# Patient Record
Sex: FEMALE | Race: WHITE | NOT HISPANIC OR LATINO | URBAN - METROPOLITAN AREA
[De-identification: names, ages, dates, MRNs, and addresses within clinical notes are randomized per-mention and may not be internally consistent; named-entity substitution may affect disease eponyms.]

---

## 2019-01-02 ENCOUNTER — WALK IN (OUTPATIENT)
Dept: URGENT CARE | Age: 34
End: 2019-01-02

## 2019-01-02 VITALS
TEMPERATURE: 98.3 F | DIASTOLIC BLOOD PRESSURE: 80 MMHG | OXYGEN SATURATION: 98 % | SYSTOLIC BLOOD PRESSURE: 108 MMHG | HEART RATE: 76 BPM | RESPIRATION RATE: 16 BRPM

## 2019-01-02 DIAGNOSIS — K04.7 DENTAL INFECTION: ICD-10-CM

## 2019-01-02 DIAGNOSIS — J01.00 ACUTE NON-RECURRENT MAXILLARY SINUSITIS: Primary | ICD-10-CM

## 2019-01-02 DIAGNOSIS — H10.9 BACTERIAL CONJUNCTIVITIS: ICD-10-CM

## 2019-01-02 PROBLEM — K02.9 INFECTED DENTAL CARRIES: Status: ACTIVE | Noted: 2019-01-02

## 2019-01-02 PROCEDURE — 99203 OFFICE O/P NEW LOW 30 MIN: CPT | Performed by: NURSE PRACTITIONER

## 2019-01-02 RX ORDER — LEVALBUTEROL TARTRATE 45 UG/1
1-2 AEROSOL, METERED ORAL EVERY 4 HOURS PRN
COMMUNITY

## 2019-01-02 RX ORDER — CLONAZEPAM 0.5 MG/1
0.5 TABLET ORAL DAILY
COMMUNITY

## 2019-01-02 RX ORDER — AMOXICILLIN AND CLAVULANATE POTASSIUM 875; 125 MG/1; MG/1
1 TABLET, FILM COATED ORAL EVERY 12 HOURS
Qty: 20 TABLET | Refills: 0 | Status: SHIPPED | OUTPATIENT
Start: 2019-01-02 | End: 2019-01-12

## 2019-01-02 RX ORDER — TOBRAMYCIN 3 MG/ML
2 SOLUTION/ DROPS OPHTHALMIC EVERY 4 HOURS
Qty: 1 BOTTLE | Refills: 1 | Status: SHIPPED | OUTPATIENT
Start: 2019-01-02 | End: 2019-01-12

## 2019-01-02 RX ORDER — ACETAMINOPHEN 325 MG/1
650 TABLET ORAL 2 TIMES DAILY
COMMUNITY

## 2019-01-02 ASSESSMENT — ENCOUNTER SYMPTOMS
ACTIVITY CHANGE: 1
CHILLS: 0
EYE DISCHARGE: 1
ENDOCRINE NEGATIVE: 1
FEVER: 1
APPETITE CHANGE: 1
FATIGUE: 1
CHEST TIGHTNESS: 0
EYE ITCHING: 1
SINUS PRESSURE: 1
EYE REDNESS: 1
APNEA: 0
SHORTNESS OF BREATH: 0
GASTROINTESTINAL NEGATIVE: 1
STRIDOR: 0
COUGH: 1
SORE THROAT: 1
CHOKING: 0
NEUROLOGICAL NEGATIVE: 1
WHEEZING: 0
PHOTOPHOBIA: 0

## 2019-01-19 DIAGNOSIS — J01.00 ACUTE NON-RECURRENT MAXILLARY SINUSITIS: ICD-10-CM

## 2019-01-19 RX ORDER — TOBRAMYCIN 3 MG/ML
SOLUTION/ DROPS OPHTHALMIC
Qty: 5 ML | Refills: 0 | OUTPATIENT
Start: 2019-01-19

## 2019-02-17 DIAGNOSIS — J01.00 ACUTE NON-RECURRENT MAXILLARY SINUSITIS: ICD-10-CM

## 2019-02-17 RX ORDER — AMOXICILLIN AND CLAVULANATE POTASSIUM 875; 125 MG/1; MG/1
TABLET, FILM COATED ORAL
Qty: 20 TABLET | Refills: 0 | OUTPATIENT
Start: 2019-02-17

## 2019-03-07 ENCOUNTER — HOSPITAL (OUTPATIENT)
Dept: OTHER | Age: 34
End: 2019-03-07
Attending: EMERGENCY MEDICINE

## 2019-07-26 ENCOUNTER — TELEPHONE (OUTPATIENT)
Dept: SCHEDULING | Age: 34
End: 2019-07-26

## 2019-08-02 ENCOUNTER — HOSPITAL (OUTPATIENT)
Dept: OTHER | Age: 34
End: 2019-08-02

## 2019-08-02 LAB
ANALYZER ANC (IANC): NORMAL
ANION GAP SERPL CALC-SCNC: 9 MMOL/L (ref 10–20)
APTT PPP: 32 SEC (ref 22–32)
APTT PPP: NORMAL S
APTT PPP: NORMAL S
BASOPHILS # BLD: 0.1 K/MCL (ref 0–0.3)
BASOPHILS NFR BLD: 1 %
BUN SERPL-MCNC: 9 MG/DL (ref 6–20)
BUN/CREAT SERPL: 12 (ref 7–25)
CALCIUM SERPL-MCNC: 9.1 MG/DL (ref 8.4–10.2)
CHLORIDE SERPL-SCNC: 108 MMOL/L (ref 98–107)
CO2 SERPL-SCNC: 25 MMOL/L (ref 21–32)
CREAT SERPL-MCNC: 0.74 MG/DL (ref 0.51–0.95)
DIFFERENTIAL METHOD BLD: NORMAL
EOSINOPHIL # BLD: 0.1 K/MCL (ref 0.1–0.5)
EOSINOPHIL NFR BLD: 2 %
ERYTHROCYTE [DISTWIDTH] IN BLOOD: 12.6 % (ref 11–15)
GLUCOSE SERPL-MCNC: 92 MG/DL (ref 65–99)
HCG POINT OF CARE (5HGRST): NEGATIVE
HCT VFR BLD CALC: 41.7 % (ref 36–46.5)
HGB BLD-MCNC: 14.5 G/DL (ref 12–15.5)
IMM GRANULOCYTES # BLD AUTO: 0 K/MCL (ref 0–0.2)
IMM GRANULOCYTES NFR BLD: 0 %
INR PPP: 1
INR PPP: NORMAL
LYMPHOCYTES # BLD: 2 K/MCL (ref 1–4.8)
LYMPHOCYTES NFR BLD: 29 %
MCH RBC QN AUTO: 30.3 PG (ref 26–34)
MCHC RBC AUTO-ENTMCNC: 34.8 G/DL (ref 32–36.5)
MCV RBC AUTO: 87.2 FL (ref 78–100)
MONOCYTES # BLD: 0.6 K/MCL (ref 0.3–0.9)
MONOCYTES NFR BLD: 9 %
NEUTROPHILS # BLD: 4 K/MCL (ref 1.8–7.7)
NEUTROPHILS NFR BLD: 59 %
NEUTS SEG NFR BLD: NORMAL %
NRBC (NRBCRE): 0 /100 WBC
PLATELET # BLD: 254 K/MCL (ref 140–450)
POTASSIUM SERPL-SCNC: 3.9 MMOL/L (ref 3.4–5.1)
PROTHROMBIN TIME (PRT2): NORMAL
PROTHROMBIN TIME: 10.1 SEC (ref 9.7–11.8)
RBC # BLD: 4.78 MIL/MCL (ref 4–5.2)
SODIUM SERPL-SCNC: 138 MMOL/L (ref 135–145)
WBC # BLD: 6.7 K/MCL (ref 4.2–11)

## 2019-09-21 ENCOUNTER — HOSPITAL (OUTPATIENT)
Dept: OTHER | Age: 34
End: 2019-09-21

## 2019-09-21 LAB
AMORPH SED URNS QL MICRO: ABNORMAL
APPEARANCE UR: CLEAR
BACTERIA #/AREA URNS HPF: ABNORMAL /HPF
BILIRUB UR QL: NEGATIVE
CAOX CRY URNS QL MICRO: ABNORMAL
COLOR UR: ABNORMAL
EPITH CASTS #/AREA URNS LPF: ABNORMAL /[LPF]
FATTY CASTS #/AREA URNS LPF: ABNORMAL /[LPF]
GLUCOSE UR-MCNC: NEGATIVE MG/DL
GRAN CASTS #/AREA URNS LPF: ABNORMAL /[LPF]
HCG POINT OF CARE (5HGRST): NEGATIVE
HGB UR QL: NEGATIVE
HYALINE CASTS #/AREA URNS LPF: ABNORMAL /LPF (ref 0–5)
KETONES UR-MCNC: NEGATIVE MG/DL
LEUKOCYTE ESTERASE UR QL STRIP: NEGATIVE
MIXED CELL CASTS #/AREA URNS LPF: ABNORMAL /[LPF]
MUCOUS THREADS URNS QL MICRO: PRESENT
NITRITE UR QL: NEGATIVE
PH UR: 6 UNITS (ref 5–7)
PROT UR QL: NEGATIVE MG/DL
RBC #/AREA URNS HPF: ABNORMAL /HPF (ref 0–2)
RBC CASTS #/AREA URNS LPF: ABNORMAL /[LPF]
RENAL EPI CELLS #/AREA URNS HPF: ABNORMAL /[HPF]
SP GR UR: 1 (ref 1–1.03)
SPECIMEN SOURCE: ABNORMAL
SPERM URNS QL MICRO: ABNORMAL
SQUAMOUS #/AREA URNS HPF: ABNORMAL /HPF (ref 0–5)
T VAGINALIS URNS QL MICRO: ABNORMAL
TRI-PHOS CRY URNS QL MICRO: ABNORMAL
URATE CRY URNS QL MICRO: ABNORMAL
URINE REFLEX: ABNORMAL
URNS CMNT MICRO: ABNORMAL
UROBILINOGEN UR QL: 0.2 MG/DL (ref 0–1)
WAXY CASTS #/AREA URNS LPF: ABNORMAL /[LPF]
WBC #/AREA URNS HPF: ABNORMAL /HPF (ref 0–5)
WBC CASTS #/AREA URNS LPF: ABNORMAL /[LPF]
YEAST HYPHAE URNS QL MICRO: ABNORMAL
YEAST URNS QL MICRO: ABNORMAL

## 2020-02-05 VITALS
RESPIRATION RATE: 14 BRPM | BODY MASS INDEX: 24.73 KG/M2 | OXYGEN SATURATION: 98 % | HEIGHT: 61 IN | DIASTOLIC BLOOD PRESSURE: 83 MMHG | WEIGHT: 131 LBS | SYSTOLIC BLOOD PRESSURE: 132 MMHG | TEMPERATURE: 99 F | HEART RATE: 80 BPM

## 2020-02-05 PROCEDURE — 99284 EMERGENCY DEPT VISIT MOD MDM: CPT

## 2020-02-05 PROCEDURE — 96374 THER/PROPH/DIAG INJ IV PUSH: CPT

## 2020-02-05 PROCEDURE — 93005 ELECTROCARDIOGRAM TRACING: CPT

## 2020-02-05 PROCEDURE — 93010 ELECTROCARDIOGRAM REPORT: CPT | Performed by: EMERGENCY MEDICINE

## 2020-02-06 ENCOUNTER — APPOINTMENT (OUTPATIENT)
Dept: CT IMAGING | Facility: HOSPITAL | Age: 35
End: 2020-02-06
Attending: EMERGENCY MEDICINE
Payer: MEDICAID

## 2020-02-06 ENCOUNTER — HOSPITAL ENCOUNTER (EMERGENCY)
Facility: HOSPITAL | Age: 35
Discharge: HOME | End: 2020-02-06
Attending: EMERGENCY MEDICINE
Payer: MEDICAID

## 2020-02-06 DIAGNOSIS — R10.9 ABDOMINAL PAIN OF UNKNOWN ETIOLOGY: Primary | ICD-10-CM

## 2020-02-06 LAB
ANION GAP SERPL CALC-SCNC: 8 MMOL/L (ref 0–18)
B-HCG UR QL: NEGATIVE
BACTERIA UR QL AUTO: NEGATIVE /HPF
BASOPHILS # BLD AUTO: 0.03 X10(3) UL (ref 0–0.2)
BASOPHILS NFR BLD AUTO: 0.4 %
BILIRUB UR QL: NEGATIVE
BUN BLD-MCNC: 14 MG/DL (ref 7–18)
BUN/CREAT SERPL: 21.9 (ref 10–20)
CALCIUM BLD-MCNC: 8.7 MG/DL (ref 8.5–10.1)
CHLORIDE SERPL-SCNC: 107 MMOL/L (ref 98–112)
CLARITY UR: CLEAR
CO2 SERPL-SCNC: 26 MMOL/L (ref 21–32)
COLOR UR: YELLOW
CREAT BLD-MCNC: 0.64 MG/DL (ref 0.55–1.02)
DEPRECATED RDW RBC AUTO: 39 FL (ref 35.1–46.3)
EOSINOPHIL # BLD AUTO: 0.2 X10(3) UL (ref 0–0.7)
EOSINOPHIL NFR BLD AUTO: 2.4 %
ERYTHROCYTE [DISTWIDTH] IN BLOOD BY AUTOMATED COUNT: 12 % (ref 11–15)
GLUCOSE BLD-MCNC: 109 MG/DL (ref 70–99)
GLUCOSE UR-MCNC: NEGATIVE MG/DL
HCT VFR BLD AUTO: 39.6 % (ref 35–48)
HGB BLD-MCNC: 13.3 G/DL (ref 12–16)
HGB UR QL STRIP.AUTO: NEGATIVE
IMM GRANULOCYTES # BLD AUTO: 0.02 X10(3) UL (ref 0–1)
IMM GRANULOCYTES NFR BLD: 0.2 %
KETONES UR-MCNC: NEGATIVE MG/DL
LYMPHOCYTES # BLD AUTO: 3.21 X10(3) UL (ref 1–4)
LYMPHOCYTES NFR BLD AUTO: 38.5 %
MCH RBC QN AUTO: 29.8 PG (ref 26–34)
MCHC RBC AUTO-ENTMCNC: 33.6 G/DL (ref 31–37)
MCV RBC AUTO: 88.6 FL (ref 80–100)
MONOCYTES # BLD AUTO: 0.56 X10(3) UL (ref 0.1–1)
MONOCYTES NFR BLD AUTO: 6.7 %
NEUTROPHILS # BLD AUTO: 4.31 X10 (3) UL (ref 1.5–7.7)
NEUTROPHILS # BLD AUTO: 4.31 X10(3) UL (ref 1.5–7.7)
NEUTROPHILS NFR BLD AUTO: 51.8 %
NITRITE UR QL STRIP.AUTO: NEGATIVE
OSMOLALITY SERPL CALC.SUM OF ELEC: 293 MOSM/KG (ref 275–295)
PH UR: 5 [PH] (ref 5–8)
PLATELET # BLD AUTO: 281 10(3)UL (ref 150–450)
POTASSIUM SERPL-SCNC: 3.5 MMOL/L (ref 3.5–5.1)
PROT UR-MCNC: NEGATIVE MG/DL
RBC # BLD AUTO: 4.47 X10(6)UL (ref 3.8–5.3)
RBC #/AREA URNS AUTO: 1 /HPF
SODIUM SERPL-SCNC: 141 MMOL/L (ref 136–145)
SP GR UR STRIP: 1.03 (ref 1–1.03)
UROBILINOGEN UR STRIP-ACNC: <2
WBC # BLD AUTO: 8.3 X10(3) UL (ref 4–11)
WBC #/AREA URNS AUTO: 4 /HPF

## 2020-02-06 PROCEDURE — 81025 URINE PREGNANCY TEST: CPT

## 2020-02-06 PROCEDURE — 74177 CT ABD & PELVIS W/CONTRAST: CPT | Performed by: EMERGENCY MEDICINE

## 2020-02-06 PROCEDURE — 80048 BASIC METABOLIC PNL TOTAL CA: CPT | Performed by: EMERGENCY MEDICINE

## 2020-02-06 PROCEDURE — 81001 URINALYSIS AUTO W/SCOPE: CPT | Performed by: EMERGENCY MEDICINE

## 2020-02-06 PROCEDURE — 85025 COMPLETE CBC W/AUTO DIFF WBC: CPT | Performed by: EMERGENCY MEDICINE

## 2020-02-06 RX ORDER — DICYCLOMINE HCL 20 MG
20 TABLET ORAL 4 TIMES DAILY PRN
Qty: 30 TABLET | Refills: 0 | Status: SHIPPED | OUTPATIENT
Start: 2020-02-06 | End: 2020-03-07

## 2020-02-06 RX ORDER — KETOROLAC TROMETHAMINE 15 MG/ML
15 INJECTION, SOLUTION INTRAMUSCULAR; INTRAVENOUS ONCE
Status: COMPLETED | OUTPATIENT
Start: 2020-02-06 | End: 2020-02-06

## 2020-02-06 RX ORDER — FAMOTIDINE 20 MG/1
20 TABLET ORAL 2 TIMES DAILY PRN
Qty: 30 TABLET | Refills: 0 | Status: SHIPPED | OUTPATIENT
Start: 2020-02-06 | End: 2020-03-07

## 2020-02-06 NOTE — ED INITIAL ASSESSMENT (HPI)
Patient with LUQ abd pain radiating into back. Patient reports, \"It's been there for a while. \" Now getting worse. +diarrhea.

## 2020-02-06 NOTE — ED PROVIDER NOTES
Patient Seen in: [facility] AND [facility] Emergency Department      History   Patient presents with:  Abdomen/Flank Pain    Stated Complaint: Chest Pain/ ABD Pain     HPI    26-year-old female presents for complaint of left upper quadrant pain for the past few Appearance: She is well-developed. HENT:      Head: Normocephalic and atraumatic. Eyes:      General: Lids are normal.      Extraocular Movements: Extraocular movements intact. Neck:      Musculoskeletal: Normal range of motion and neck supple.    C Final result                 Please view results for these tests on the individual orders.    RAINBOW DRAW BLUE   RAINBOW DRAW LAVENDER   RAINBOW DRAW LIGHT GREEN   RAINBOW DRAW GOLD   CBC W/ DIFFERENTIAL     EKG    Rate, intervals and axes as noted o

## 2020-02-06 NOTE — ED PROVIDER NOTES
Signed out by previous shift to follow-up diagnostic work-up. Patient had presented with left upper quadrant pain intermittently for several weeks. She was pending a CT scan.     [facility]  [phone] - [address] [address]    NAME:

## 2020-03-30 ENCOUNTER — V-VISIT (OUTPATIENT)
Dept: FAMILY MEDICINE | Age: 35
End: 2020-03-30

## 2020-03-30 DIAGNOSIS — J45.21 MILD INTERMITTENT ASTHMATIC BRONCHITIS WITH ACUTE EXACERBATION: Primary | ICD-10-CM

## 2020-03-30 PROBLEM — J45.901 ASTHMATIC BRONCHITIS WITH ACUTE EXACERBATION: Status: ACTIVE | Noted: 2020-03-30

## 2020-03-30 PROCEDURE — 99213 OFFICE O/P EST LOW 20 MIN: CPT | Performed by: FAMILY MEDICINE

## 2020-03-30 RX ORDER — PREDNISONE 20 MG/1
40 TABLET ORAL DAILY
Qty: 10 TABLET | Refills: 0 | Status: SHIPPED | OUTPATIENT
Start: 2020-03-30 | End: 2020-04-04

## 2020-03-30 ASSESSMENT — ENCOUNTER SYMPTOMS
SHORTNESS OF BREATH: 1
DIARRHEA: 0
COUGH: 1
CHEST TIGHTNESS: 1
ABDOMINAL PAIN: 0
TROUBLE SWALLOWING: 0
FATIGUE: 0
EYE PAIN: 0
CONSTIPATION: 0
SORE THROAT: 0

## 2020-04-05 ENCOUNTER — E-VISIT (OUTPATIENT)
Dept: INTERNAL MEDICINE | Age: 35
End: 2020-04-05

## 2020-04-05 DIAGNOSIS — R05.9 COUGH: Primary | ICD-10-CM

## 2020-04-05 PROCEDURE — 99421 OL DIG E/M SVC 5-10 MIN: CPT | Performed by: INTERNAL MEDICINE

## 2020-04-05 ASSESSMENT — LIFESTYLE VARIABLES: SMOKING_STATUS: SOMETIMES

## 2020-04-09 ENCOUNTER — E-VISIT (OUTPATIENT)
Dept: FAMILY MEDICINE | Age: 35
End: 2020-04-09

## 2020-04-09 DIAGNOSIS — T50.905A ADVERSE EFFECT OF DRUG, INITIAL ENCOUNTER: Primary | ICD-10-CM

## 2020-04-09 PROCEDURE — 99421 OL DIG E/M SVC 5-10 MIN: CPT | Performed by: NURSE PRACTITIONER

## 2020-05-05 ENCOUNTER — TELEPHONE (OUTPATIENT)
Dept: FAMILY MEDICINE | Age: 35
End: 2020-05-05

## 2021-09-25 ENCOUNTER — HOSPITAL ENCOUNTER (EMERGENCY)
Facility: HOSPITAL | Age: 36
Discharge: HOME | End: 2021-09-25
Attending: EMERGENCY MEDICINE
Payer: MEDICAID

## 2021-09-25 ENCOUNTER — APPOINTMENT (OUTPATIENT)
Dept: CT IMAGING | Facility: HOSPITAL | Age: 36
End: 2021-09-25
Attending: EMERGENCY MEDICINE
Payer: MEDICAID

## 2021-09-25 VITALS
BODY MASS INDEX: 26.43 KG/M2 | HEIGHT: 61 IN | DIASTOLIC BLOOD PRESSURE: 78 MMHG | SYSTOLIC BLOOD PRESSURE: 113 MMHG | WEIGHT: 140 LBS | TEMPERATURE: 98 F | HEART RATE: 74 BPM | RESPIRATION RATE: 20 BRPM | OXYGEN SATURATION: 97 %

## 2021-09-25 DIAGNOSIS — F41.9 ANXIETY: ICD-10-CM

## 2021-09-25 DIAGNOSIS — R19.8 GLOBUS SENSATION: Primary | ICD-10-CM

## 2021-09-25 LAB
ANION GAP SERPL CALC-SCNC: 5 MMOL/L (ref 0–18)
B-HCG UR QL: NEGATIVE
BASOPHILS # BLD AUTO: 0.04 X10(3) UL (ref 0–0.2)
BASOPHILS NFR BLD AUTO: 0.5 %
BUN BLD-MCNC: 8 MG/DL (ref 7–18)
BUN/CREAT SERPL: 11.8 (ref 10–20)
CALCIUM BLD-MCNC: 8.6 MG/DL (ref 8.5–10.1)
CHLORIDE SERPL-SCNC: 109 MMOL/L (ref 98–112)
CO2 SERPL-SCNC: 25 MMOL/L (ref 21–32)
CREAT BLD-MCNC: 0.68 MG/DL
DEPRECATED RDW RBC AUTO: 40.3 FL (ref 35.1–46.3)
EOSINOPHIL # BLD AUTO: 0.11 X10(3) UL (ref 0–0.7)
EOSINOPHIL NFR BLD AUTO: 1.2 %
ERYTHROCYTE [DISTWIDTH] IN BLOOD BY AUTOMATED COUNT: 12.7 % (ref 11–15)
GLUCOSE BLD-MCNC: 91 MG/DL (ref 70–99)
HCT VFR BLD AUTO: 42.1 %
HGB BLD-MCNC: 14.1 G/DL
IMM GRANULOCYTES # BLD AUTO: 0.03 X10(3) UL (ref 0–1)
IMM GRANULOCYTES NFR BLD: 0.3 %
LYMPHOCYTES # BLD AUTO: 2.11 X10(3) UL (ref 1–4)
LYMPHOCYTES NFR BLD AUTO: 23.8 %
MCH RBC QN AUTO: 29.2 PG (ref 26–34)
MCHC RBC AUTO-ENTMCNC: 33.5 G/DL (ref 31–37)
MCV RBC AUTO: 87.2 FL
MONOCYTES # BLD AUTO: 0.65 X10(3) UL (ref 0.1–1)
MONOCYTES NFR BLD AUTO: 7.3 %
NEUTROPHILS # BLD AUTO: 5.92 X10 (3) UL (ref 1.5–7.7)
NEUTROPHILS # BLD AUTO: 5.92 X10(3) UL (ref 1.5–7.7)
NEUTROPHILS NFR BLD AUTO: 66.9 %
OSMOLALITY SERPL CALC.SUM OF ELEC: 286 MOSM/KG (ref 275–295)
PLATELET # BLD AUTO: 223 10(3)UL (ref 150–450)
POTASSIUM SERPL-SCNC: 3.6 MMOL/L (ref 3.5–5.1)
RBC # BLD AUTO: 4.83 X10(6)UL
SODIUM SERPL-SCNC: 139 MMOL/L (ref 136–145)
WBC # BLD AUTO: 8.9 X10(3) UL (ref 4–11)

## 2021-09-25 PROCEDURE — 99284 EMERGENCY DEPT VISIT MOD MDM: CPT

## 2021-09-25 PROCEDURE — 85025 COMPLETE CBC W/AUTO DIFF WBC: CPT | Performed by: EMERGENCY MEDICINE

## 2021-09-25 PROCEDURE — 81025 URINE PREGNANCY TEST: CPT

## 2021-09-25 PROCEDURE — 70491 CT SOFT TISSUE NECK W/DYE: CPT | Performed by: EMERGENCY MEDICINE

## 2021-09-25 PROCEDURE — 80048 BASIC METABOLIC PNL TOTAL CA: CPT | Performed by: EMERGENCY MEDICINE

## 2021-09-25 PROCEDURE — 36415 COLL VENOUS BLD VENIPUNCTURE: CPT

## 2021-09-25 RX ORDER — DIPHENHYDRAMINE HYDROCHLORIDE 50 MG/ML
25 INJECTION INTRAMUSCULAR; INTRAVENOUS ONCE
Status: DISCONTINUED | OUTPATIENT
Start: 2021-09-25 | End: 2021-09-25

## 2021-09-25 RX ORDER — METHYLPREDNISOLONE SODIUM SUCCINATE 125 MG/2ML
125 INJECTION, POWDER, LYOPHILIZED, FOR SOLUTION INTRAMUSCULAR; INTRAVENOUS ONCE
Status: DISCONTINUED | OUTPATIENT
Start: 2021-09-25 | End: 2021-09-25

## 2021-09-25 NOTE — ED INITIAL ASSESSMENT (HPI)
Pt c/o neck pain + difficulty swallowing started 14 days ago, sts that she has nodule in her neck and to her parotid gland+thyroid gland but could not get an MRI, denies fever

## 2021-09-25 NOTE — ED QUICK NOTES
Patient safe to DC home per MD. Dixon Schmitz to dress self. DC teaching done, instructions reviewed with patient including when and how to follow up with healthcare providers and when to seek emergency care. The patient verbalizes understanding.  Peripheral IV disc

## 2021-09-25 NOTE — ED PROVIDER NOTES
Patient Seen in: [facility] AND [facility] Emergency Department      History   Patient presents with:  Neck Pain  Difficulty Swallowing    Stated Complaint: Neck pain    Subjective:   HPI    The patient is a 67-year-old female with a history of anxiety who prese SpO2 97%   BMI 26.45 kg/m²         Physical Exam  Vitals and nursing note reviewed. Constitutional:       General: She is not in acute distress. Appearance: Normal appearance. She is well-developed. She is not ill-appearing.    HENT:      Head: Norm Mood and Affect: Mood is anxious. Affect is tearful. Speech: Speech is rapid and pressured. Behavior: Behavior is cooperative. Thought Content:  Thought content normal.         Judgment: Judgment normal.       Differential cheli diagnosis)  Anxiety     Disposition:  Discharge  9/25/2021  6:08 pm    Follow-up:  your doctor    Schedule an appointment as soon as possible for a visit            Medications Prescribed:  There are no discharge medications for this patient.

## 2021-10-08 ENCOUNTER — HOSPITAL ENCOUNTER (EMERGENCY)
Facility: HOSPITAL | Age: 36
Discharge: HOME | End: 2021-10-08
Attending: EMERGENCY MEDICINE
Payer: MEDICAID

## 2021-10-08 VITALS
BODY MASS INDEX: 27.38 KG/M2 | OXYGEN SATURATION: 97 % | HEART RATE: 90 BPM | SYSTOLIC BLOOD PRESSURE: 147 MMHG | WEIGHT: 145 LBS | DIASTOLIC BLOOD PRESSURE: 78 MMHG | TEMPERATURE: 98 F | RESPIRATION RATE: 18 BRPM | HEIGHT: 61 IN

## 2021-10-08 DIAGNOSIS — M26.622 ARTHRALGIA OF LEFT TEMPOROMANDIBULAR JOINT: Primary | ICD-10-CM

## 2021-10-08 PROCEDURE — 99283 EMERGENCY DEPT VISIT LOW MDM: CPT

## 2021-10-08 RX ORDER — IBUPROFEN 400 MG/1
400 TABLET ORAL 2 TIMES DAILY
COMMUNITY

## 2021-10-08 RX ORDER — KETOROLAC TROMETHAMINE 10 MG/1
10 TABLET, FILM COATED ORAL EVERY 6 HOURS PRN
Qty: 20 TABLET | Refills: 0 | Status: SHIPPED | OUTPATIENT
Start: 2021-10-08

## 2021-10-08 RX ORDER — FLUTICASONE PROPIONATE 50 MCG
2 SPRAY, SUSPENSION (ML) NASAL DAILY
COMMUNITY
Start: 2021-06-29

## 2021-10-08 RX ORDER — CLONAZEPAM 0.25 MG/1
0.5 TABLET, ORALLY DISINTEGRATING ORAL AS DIRECTED
COMMUNITY

## 2021-10-08 NOTE — ED INITIAL ASSESSMENT (HPI)
Patient presents for evaluation of increasing pain to left side of face/ear/neck that has been present for over a month. She states she was treated for an ear infection and it did not improve.  She was then evaluated by ENT and was told she needed an Cris

## 2021-10-08 NOTE — ED PROVIDER NOTES
Patient Seen in: [facility] Emergency Department      History   Patient presents with:  Headache    Stated Complaint: head pain radiating down neck    Subjective:   HPI    This is a 30 female who presents with past medical history of anxiety and bibiana Physical Exam    GENERAL: Awake, alert oriented x3, nontoxic appearing. SKIN: Normal, warm, and dry. HEENT:  Pupils equally round and reactive to light. Conjuctiva clear. TMs are clear and intact bilaterally.   Oropharynx is clear and moist.  Mu

## 2022-02-19 ENCOUNTER — HOSPITAL ENCOUNTER (OUTPATIENT)
Age: 37
Discharge: HOME | End: 2022-02-19
Payer: MEDICAID

## 2022-02-19 VITALS
BODY MASS INDEX: 25.4 KG/M2 | OXYGEN SATURATION: 99 % | RESPIRATION RATE: 18 BRPM | SYSTOLIC BLOOD PRESSURE: 131 MMHG | HEIGHT: 62 IN | DIASTOLIC BLOOD PRESSURE: 90 MMHG | WEIGHT: 138 LBS | TEMPERATURE: 98 F | HEART RATE: 103 BPM

## 2022-02-19 DIAGNOSIS — K59.00 CONSTIPATION, UNSPECIFIED CONSTIPATION TYPE: ICD-10-CM

## 2022-02-19 DIAGNOSIS — K64.9 HEMORRHOIDS, UNSPECIFIED HEMORRHOID TYPE: Primary | ICD-10-CM

## 2022-02-19 PROCEDURE — 99213 OFFICE O/P EST LOW 20 MIN: CPT

## 2022-02-19 PROCEDURE — 99212 OFFICE O/P EST SF 10 MIN: CPT

## 2022-02-19 NOTE — ED INITIAL ASSESSMENT (HPI)
Feels constipated, no bm for 6 days, now had 3 bm with blood, + h/o hemorrhoids, lower abd cramps, no n/v

## 2022-03-02 ENCOUNTER — OFFICE VISIT (OUTPATIENT)
Dept: INTEGRATIVE MEDICINE | Facility: CLINIC | Age: 37
End: 2022-03-02
Payer: MEDICAID

## 2022-03-02 VITALS
HEIGHT: 62 IN | OXYGEN SATURATION: 98 % | SYSTOLIC BLOOD PRESSURE: 124 MMHG | DIASTOLIC BLOOD PRESSURE: 80 MMHG | HEART RATE: 101 BPM | BODY MASS INDEX: 26.31 KG/M2 | WEIGHT: 143 LBS

## 2022-03-02 DIAGNOSIS — F32.81 PMDD (PREMENSTRUAL DYSPHORIC DISORDER): ICD-10-CM

## 2022-03-02 DIAGNOSIS — M79.605 LEG PAIN, BILATERAL: Primary | ICD-10-CM

## 2022-03-02 DIAGNOSIS — K02.9 DENTAL CARIES: ICD-10-CM

## 2022-03-02 DIAGNOSIS — M25.50 ARTHRALGIA, UNSPECIFIED JOINT: ICD-10-CM

## 2022-03-02 DIAGNOSIS — K59.00 CONSTIPATION, UNSPECIFIED CONSTIPATION TYPE: ICD-10-CM

## 2022-03-02 DIAGNOSIS — M79.604 LEG PAIN, BILATERAL: Primary | ICD-10-CM

## 2022-03-02 DIAGNOSIS — G43.909 MIGRAINE WITHOUT STATUS MIGRAINOSUS, NOT INTRACTABLE, UNSPECIFIED MIGRAINE TYPE: ICD-10-CM

## 2022-03-02 DIAGNOSIS — L73.2 HYDRADENITIS: ICD-10-CM

## 2022-03-02 PROCEDURE — 3008F BODY MASS INDEX DOCD: CPT | Performed by: FAMILY MEDICINE

## 2022-03-02 PROCEDURE — 3074F SYST BP LT 130 MM HG: CPT | Performed by: FAMILY MEDICINE

## 2022-03-02 PROCEDURE — 99205 OFFICE O/P NEW HI 60 MIN: CPT | Performed by: FAMILY MEDICINE

## 2022-03-02 PROCEDURE — 3079F DIAST BP 80-89 MM HG: CPT | Performed by: FAMILY MEDICINE

## 2022-03-02 RX ORDER — MULTIVITAMIN
TABLET ORAL
COMMUNITY

## 2022-03-02 RX ORDER — EPINEPHRINE 0.3 MG/.3ML
0.3 INJECTION SUBCUTANEOUS
COMMUNITY
Start: 2020-06-16

## 2022-03-03 ENCOUNTER — APPOINTMENT (OUTPATIENT)
Dept: ULTRASOUND IMAGING | Age: 37
End: 2022-03-03
Attending: NURSE PRACTITIONER
Payer: MEDICAID

## 2022-03-03 ENCOUNTER — HOSPITAL ENCOUNTER (OUTPATIENT)
Age: 37
Discharge: HOME | End: 2022-03-03
Payer: MEDICAID

## 2022-03-03 VITALS
HEART RATE: 83 BPM | RESPIRATION RATE: 18 BRPM | TEMPERATURE: 99 F | SYSTOLIC BLOOD PRESSURE: 128 MMHG | OXYGEN SATURATION: 100 % | DIASTOLIC BLOOD PRESSURE: 81 MMHG

## 2022-03-03 DIAGNOSIS — M62.838 SPASM OF MUSCLE: Primary | ICD-10-CM

## 2022-03-03 LAB
#MXD IC: 0.4 X10ˆ3/UL (ref 0.1–1)
B-HCG UR QL: NEGATIVE
BILIRUB UR QL STRIP: NEGATIVE
BUN BLD-MCNC: 9 MG/DL (ref 7–18)
CHLORIDE BLD-SCNC: 105 MMOL/L (ref 98–112)
CLARITY UR: CLEAR
CO2 BLD-SCNC: 24 MMOL/L (ref 21–32)
COLOR UR: YELLOW
CREAT BLD-MCNC: 0.6 MG/DL
GLUCOSE BLD-MCNC: 99 MG/DL (ref 70–99)
GLUCOSE UR STRIP-MCNC: NEGATIVE MG/DL
HCT VFR BLD AUTO: 43.7 %
HCT VFR BLD CALC: 46 %
HGB BLD-MCNC: 14.7 G/DL
HGB UR QL STRIP: NEGATIVE
ISTAT IONIZED CALCIUM FOR CHEM 8: 1.28 MMOL/L (ref 1.12–1.32)
KETONES UR STRIP-MCNC: NEGATIVE MG/DL
LEUKOCYTE ESTERASE UR QL STRIP: NEGATIVE
LYMPHOCYTES # BLD AUTO: 2.3 X10ˆ3/UL (ref 1–4)
LYMPHOCYTES NFR BLD AUTO: 26.7 %
MCH RBC QN AUTO: 29.2 PG (ref 26–34)
MCHC RBC AUTO-ENTMCNC: 33.6 G/DL (ref 31–37)
MCV RBC AUTO: 86.7 FL (ref 80–100)
MIXED CELL %: 4 %
NEUTROPHILS # BLD AUTO: 6.1 X10ˆ3/UL (ref 1.5–7.7)
NEUTROPHILS NFR BLD AUTO: 69.3 %
NITRITE UR QL STRIP: NEGATIVE
PLATELET # BLD AUTO: 212 X10ˆ3/UL (ref 150–450)
POTASSIUM BLD-SCNC: 3.8 MMOL/L (ref 3.6–5.1)
PROT UR STRIP-MCNC: NEGATIVE MG/DL
RBC # BLD AUTO: 5.04 X10ˆ6/UL
SODIUM BLD-SCNC: 140 MMOL/L (ref 136–145)
SP GR UR STRIP: 1.02
UROBILINOGEN UR STRIP-ACNC: <2 MG/DL
WBC # BLD AUTO: 8.8 X10ˆ3/UL (ref 4–11)

## 2022-03-03 PROCEDURE — 80047 BASIC METABLC PNL IONIZED CA: CPT

## 2022-03-03 PROCEDURE — 99214 OFFICE O/P EST MOD 30 MIN: CPT

## 2022-03-03 PROCEDURE — 99213 OFFICE O/P EST LOW 20 MIN: CPT

## 2022-03-03 PROCEDURE — 81025 URINE PREGNANCY TEST: CPT

## 2022-03-03 PROCEDURE — 81002 URINALYSIS NONAUTO W/O SCOPE: CPT

## 2022-03-03 PROCEDURE — 85025 COMPLETE CBC W/AUTO DIFF WBC: CPT | Performed by: NURSE PRACTITIONER

## 2022-03-03 PROCEDURE — 36415 COLL VENOUS BLD VENIPUNCTURE: CPT

## 2022-03-03 PROCEDURE — 93971 EXTREMITY STUDY: CPT | Performed by: NURSE PRACTITIONER

## 2022-03-03 NOTE — ED INITIAL ASSESSMENT (HPI)
Left leg pain for 2 days, no trauma or injury, cms intact, no swelling, denies recent long plane or car rides, no cp, no sob, saw pmd yesterday

## 2022-03-10 ENCOUNTER — LAB ENCOUNTER (OUTPATIENT)
Dept: LAB | Age: 37
End: 2022-03-10
Attending: FAMILY MEDICINE
Payer: MEDICAID

## 2022-03-10 DIAGNOSIS — L73.2 HYDRADENITIS: ICD-10-CM

## 2022-03-10 DIAGNOSIS — K02.9 DENTAL CARIES: ICD-10-CM

## 2022-03-10 DIAGNOSIS — K59.00 CONSTIPATION, UNSPECIFIED CONSTIPATION TYPE: ICD-10-CM

## 2022-03-10 DIAGNOSIS — M79.605 LEG PAIN, BILATERAL: ICD-10-CM

## 2022-03-10 DIAGNOSIS — M79.604 LEG PAIN, BILATERAL: ICD-10-CM

## 2022-03-10 DIAGNOSIS — F32.81 PMDD (PREMENSTRUAL DYSPHORIC DISORDER): ICD-10-CM

## 2022-03-10 DIAGNOSIS — M25.50 ARTHRALGIA, UNSPECIFIED JOINT: ICD-10-CM

## 2022-03-10 DIAGNOSIS — G43.909 MIGRAINE WITHOUT STATUS MIGRAINOSUS, NOT INTRACTABLE, UNSPECIFIED MIGRAINE TYPE: ICD-10-CM

## 2022-03-10 LAB
ALBUMIN SERPL-MCNC: 3.8 G/DL (ref 3.4–5)
ALBUMIN/GLOB SERPL: 1.1 {RATIO} (ref 1–2)
ALP LIVER SERPL-CCNC: 76 U/L
ALT SERPL-CCNC: 18 U/L
ANION GAP SERPL CALC-SCNC: 6 MMOL/L (ref 0–18)
AST SERPL-CCNC: 10 U/L (ref 15–37)
BASOPHILS # BLD AUTO: 0.05 X10(3) UL (ref 0–0.2)
BASOPHILS NFR BLD AUTO: 0.5 %
BILIRUB SERPL-MCNC: 0.3 MG/DL (ref 0.1–2)
BUN BLD-MCNC: 11 MG/DL (ref 7–18)
BUN/CREAT SERPL: 16.2 (ref 10–20)
CALCIUM BLD-MCNC: 9.4 MG/DL (ref 8.5–10.1)
CHLORIDE SERPL-SCNC: 109 MMOL/L (ref 98–112)
CHOLEST SERPL-MCNC: 184 MG/DL (ref ?–200)
CO2 SERPL-SCNC: 27 MMOL/L (ref 21–32)
CREAT BLD-MCNC: 0.68 MG/DL
CRP SERPL-MCNC: <0.29 MG/DL (ref ?–0.3)
DEPRECATED HBV CORE AB SER IA-ACNC: 86.5 NG/ML
DEPRECATED RDW RBC AUTO: 42.5 FL (ref 35.1–46.3)
DHEA-S SERPL-MCNC: 255 UG/DL
EOSINOPHIL # BLD AUTO: 0.15 X10(3) UL (ref 0–0.7)
EOSINOPHIL NFR BLD AUTO: 1.4 %
ERYTHROCYTE [DISTWIDTH] IN BLOOD BY AUTOMATED COUNT: 13.1 % (ref 11–15)
FASTING PATIENT LIPID ANSWER: YES
FOLATE SERPL-MCNC: 15.2 NG/ML (ref 8.7–?)
FSH SERPL-ACNC: 4.4 MIU/ML
GGT SERPL-CCNC: 14 U/L
GLOBULIN PLAS-MCNC: 3.4 G/DL (ref 2.8–4.4)
GLUCOSE BLD-MCNC: 97 MG/DL (ref 70–99)
HCT VFR BLD AUTO: 44.9 %
HDLC SERPL-MCNC: 45 MG/DL (ref 40–59)
HGB BLD-MCNC: 14.6 G/DL
IMM GRANULOCYTES # BLD AUTO: 0.04 X10(3) UL (ref 0–1)
IMM GRANULOCYTES NFR BLD: 0.4 %
IRON SATN MFR SERPL: 13 %
IRON SERPL-MCNC: 41 UG/DL
LDLC SERPL CALC-MCNC: 125 MG/DL (ref ?–100)
LH SERPL-ACNC: 9.3 MIU/ML
LYMPHOCYTES # BLD AUTO: 2.22 X10(3) UL (ref 1–4)
LYMPHOCYTES NFR BLD AUTO: 21.4 %
MAGNESIUM SERPL-MCNC: 2 MG/DL (ref 1.6–2.6)
MCH RBC QN AUTO: 29 PG (ref 26–34)
MCHC RBC AUTO-ENTMCNC: 32.5 G/DL (ref 31–37)
MCV RBC AUTO: 89.1 FL
MONOCYTES # BLD AUTO: 0.58 X10(3) UL (ref 0.1–1)
MONOCYTES NFR BLD AUTO: 5.6 %
NEUTROPHILS # BLD AUTO: 7.34 X10 (3) UL (ref 1.5–7.7)
NEUTROPHILS # BLD AUTO: 7.34 X10(3) UL (ref 1.5–7.7)
NEUTROPHILS NFR BLD AUTO: 70.7 %
NONHDLC SERPL-MCNC: 139 MG/DL (ref ?–130)
OSMOLALITY SERPL CALC.SUM OF ELEC: 293 MOSM/KG (ref 275–295)
PLATELET # BLD AUTO: 232 10(3)UL (ref 150–450)
POTASSIUM SERPL-SCNC: 4.1 MMOL/L (ref 3.5–5.1)
PROGEST SERPL-MCNC: 1.34 NG/ML
PROT SERPL-MCNC: 7.2 G/DL (ref 6.4–8.2)
RBC # BLD AUTO: 5.04 X10(6)UL
SODIUM SERPL-SCNC: 142 MMOL/L (ref 136–145)
T3FREE SERPL-MCNC: 3.04 PG/ML (ref 2.4–4.2)
T4 FREE SERPL-MCNC: 1.2 NG/DL (ref 0.8–1.7)
TIBC SERPL-MCNC: 305 UG/DL (ref 240–450)
TRANSFERRIN SERPL-MCNC: 205 MG/DL (ref 200–360)
TRIGL SERPL-MCNC: 77 MG/DL (ref 30–149)
TSI SER-ACNC: 0.68 MIU/ML (ref 0.36–3.74)
VIT B12 SERPL-MCNC: 324 PG/ML (ref 193–986)
VIT D+METAB SERPL-MCNC: 28 NG/ML (ref 30–100)
VLDLC SERPL CALC-MCNC: 14 MG/DL (ref 0–30)

## 2022-03-10 PROCEDURE — 82607 VITAMIN B-12: CPT

## 2022-03-10 PROCEDURE — 80053 COMPREHEN METABOLIC PANEL: CPT

## 2022-03-10 PROCEDURE — 82977 ASSAY OF GGT: CPT

## 2022-03-10 PROCEDURE — 83540 ASSAY OF IRON: CPT

## 2022-03-10 PROCEDURE — 83001 ASSAY OF GONADOTROPIN (FSH): CPT

## 2022-03-10 PROCEDURE — 84466 ASSAY OF TRANSFERRIN: CPT

## 2022-03-10 PROCEDURE — 80061 LIPID PANEL: CPT

## 2022-03-10 PROCEDURE — 86038 ANTINUCLEAR ANTIBODIES: CPT

## 2022-03-10 PROCEDURE — 84439 ASSAY OF FREE THYROXINE: CPT

## 2022-03-10 PROCEDURE — 82627 DEHYDROEPIANDROSTERONE: CPT

## 2022-03-10 PROCEDURE — 82671 ASSAY OF ESTROGENS: CPT

## 2022-03-10 PROCEDURE — 82746 ASSAY OF FOLIC ACID SERUM: CPT

## 2022-03-10 PROCEDURE — 82306 VITAMIN D 25 HYDROXY: CPT

## 2022-03-10 PROCEDURE — 83735 ASSAY OF MAGNESIUM: CPT

## 2022-03-10 PROCEDURE — 84481 FREE ASSAY (FT-3): CPT

## 2022-03-10 PROCEDURE — 84403 ASSAY OF TOTAL TESTOSTERONE: CPT

## 2022-03-10 PROCEDURE — 83002 ASSAY OF GONADOTROPIN (LH): CPT

## 2022-03-10 PROCEDURE — 84402 ASSAY OF FREE TESTOSTERONE: CPT

## 2022-03-10 PROCEDURE — 82728 ASSAY OF FERRITIN: CPT

## 2022-03-10 PROCEDURE — 84443 ASSAY THYROID STIM HORMONE: CPT

## 2022-03-10 PROCEDURE — 85025 COMPLETE CBC W/AUTO DIFF WBC: CPT

## 2022-03-10 PROCEDURE — 36415 COLL VENOUS BLD VENIPUNCTURE: CPT

## 2022-03-10 PROCEDURE — 86140 C-REACTIVE PROTEIN: CPT

## 2022-03-10 PROCEDURE — 84144 ASSAY OF PROGESTERONE: CPT

## 2022-03-14 LAB — NUCLEAR IGG TITR SER IF: NEGATIVE {TITER}

## 2022-03-18 LAB
TESTOSTERONE, FREE, S: 0.67 NG/DL
TESTOSTERONE, TOTAL, S: 28 NG/DL

## 2022-03-19 LAB
ESTRADIOL BY TMS: 130.2 PG/ML
ESTROGENS TOTAL CALCULATION: 179 PG/ML
ESTRONE BY TMS: 48.8 PG/ML

## 2022-04-21 ENCOUNTER — MED REC SCAN ONLY (OUTPATIENT)
Dept: INTEGRATIVE MEDICINE | Facility: CLINIC | Age: 37
End: 2022-04-21

## 2022-04-22 ENCOUNTER — TELEMEDICINE (OUTPATIENT)
Dept: INTEGRATIVE MEDICINE | Facility: CLINIC | Age: 37
End: 2022-04-22

## 2022-04-22 DIAGNOSIS — M79.671 RIGHT FOOT PAIN: ICD-10-CM

## 2022-04-22 DIAGNOSIS — G43.909 MIGRAINE WITHOUT STATUS MIGRAINOSUS, NOT INTRACTABLE, UNSPECIFIED MIGRAINE TYPE: Primary | ICD-10-CM

## 2022-04-22 DIAGNOSIS — F32.81 PMDD (PREMENSTRUAL DYSPHORIC DISORDER): ICD-10-CM

## 2022-05-11 ENCOUNTER — TELEPHONE (OUTPATIENT)
Dept: INTEGRATIVE MEDICINE | Facility: CLINIC | Age: 37
End: 2022-05-11

## 2022-05-11 NOTE — TELEPHONE ENCOUNTER
Received Emergency room  Report form   [facility]. Placed on providers desk for review.

## 2022-05-17 ENCOUNTER — TELEPHONE (OUTPATIENT)
Dept: INTEGRATIVE MEDICINE | Facility: CLINIC | Age: 37
End: 2022-05-17

## 2022-05-17 NOTE — TELEPHONE ENCOUNTER
Microbiology report received  From [facility]  and placed on providers desk for review.

## 2022-07-19 PROBLEM — J45.909 ASTHMA: Status: ACTIVE | Noted: 2020-03-30

## 2022-07-19 PROBLEM — G44.59 OTHER COMPLICATED HEADACHE SYNDROME: Status: ACTIVE | Noted: 2019-10-29

## 2022-07-19 PROBLEM — F41.9 ANXIETY: Status: ACTIVE | Noted: 2022-07-19

## 2022-07-19 PROBLEM — J45.909 ASTHMA (HCC): Status: ACTIVE | Noted: 2020-03-30

## 2022-07-19 PROBLEM — K02.9 INFECTED DENTAL CARIES: Status: ACTIVE | Noted: 2019-01-02

## 2022-07-19 PROBLEM — K04.7 INFECTED DENTAL CARIES: Status: ACTIVE | Noted: 2019-01-02

## 2022-07-20 ENCOUNTER — TELEMEDICINE (OUTPATIENT)
Dept: FAMILY MEDICINE CLINIC | Facility: CLINIC | Age: 37
End: 2022-07-20

## 2022-07-20 DIAGNOSIS — N92.6 IRREGULAR MENSES: Primary | ICD-10-CM

## 2022-07-20 DIAGNOSIS — E61.1 IRON DEFICIENCY: ICD-10-CM

## 2022-07-20 DIAGNOSIS — R63.5 WEIGHT GAIN: ICD-10-CM

## 2022-07-20 DIAGNOSIS — L65.9 HAIR LOSS: ICD-10-CM

## 2022-07-20 PROCEDURE — 99214 OFFICE O/P EST MOD 30 MIN: CPT | Performed by: NURSE PRACTITIONER

## 2022-07-25 ENCOUNTER — HOSPITAL ENCOUNTER (OUTPATIENT)
Age: 37
Discharge: HOME | End: 2022-07-25
Payer: MEDICAID

## 2022-07-25 VITALS
RESPIRATION RATE: 22 BRPM | TEMPERATURE: 99 F | DIASTOLIC BLOOD PRESSURE: 78 MMHG | HEART RATE: 102 BPM | OXYGEN SATURATION: 100 % | SYSTOLIC BLOOD PRESSURE: 135 MMHG

## 2022-07-25 DIAGNOSIS — L73.2 HIDRADENITIS SUPPURATIVA OF RIGHT AXILLA: ICD-10-CM

## 2022-07-25 DIAGNOSIS — L73.2 HIDRADENITIS SUPPURATIVA OF LEFT AXILLA: ICD-10-CM

## 2022-07-25 DIAGNOSIS — L02.412 ABSCESS OF AXILLA, LEFT: Primary | ICD-10-CM

## 2022-07-25 DIAGNOSIS — L02.91 ABSCESS: ICD-10-CM

## 2022-07-25 PROCEDURE — 99213 OFFICE O/P EST LOW 20 MIN: CPT

## 2022-07-25 PROCEDURE — 10061 I&D ABSCESS COMP/MULTIPLE: CPT

## 2022-07-25 RX ORDER — CLINDAMYCIN PHOSPHATE 10 MG/G
1 GEL TOPICAL 2 TIMES DAILY
Qty: 30 G | Refills: 0 | Status: SHIPPED | OUTPATIENT
Start: 2022-07-25

## 2022-07-25 NOTE — ED INITIAL ASSESSMENT (HPI)
Abscess to left underarm for 1 week. Hx of hydrogenitis superativa. No fever. States she feels generally \"unwell\" and anxious. EMS/Patient

## 2022-08-23 ENCOUNTER — TELEMEDICINE (OUTPATIENT)
Dept: TELEHEALTH | Age: 37
End: 2022-08-23

## 2022-08-23 DIAGNOSIS — U07.1 POSITIVE SELF-ADMINISTERED ANTIGEN TEST FOR COVID-19: Primary | ICD-10-CM

## 2022-08-23 PROCEDURE — 99213 OFFICE O/P EST LOW 20 MIN: CPT | Performed by: NURSE PRACTITIONER

## 2022-08-23 RX ORDER — BENZONATATE 200 MG/1
200 CAPSULE ORAL 3 TIMES DAILY PRN
Qty: 20 CAPSULE | Refills: 0 | Status: SHIPPED | OUTPATIENT
Start: 2022-08-23

## 2022-08-23 RX ORDER — FLUTICASONE PROPIONATE 50 MCG
2 SPRAY, SUSPENSION (ML) NASAL DAILY
Qty: 1 EACH | Refills: 0 | Status: SHIPPED | OUTPATIENT
Start: 2022-08-23 | End: 2022-09-06

## 2022-08-29 ENCOUNTER — MED REC SCAN ONLY (OUTPATIENT)
Dept: INTEGRATIVE MEDICINE | Facility: CLINIC | Age: 37
End: 2022-08-29

## 2022-09-30 ENCOUNTER — TELEMEDICINE (OUTPATIENT)
Dept: INTEGRATIVE MEDICINE | Facility: CLINIC | Age: 37
End: 2022-09-30

## 2022-09-30 DIAGNOSIS — R63.5 WEIGHT GAIN: ICD-10-CM

## 2022-09-30 DIAGNOSIS — F32.81 PMDD (PREMENSTRUAL DYSPHORIC DISORDER): ICD-10-CM

## 2022-09-30 DIAGNOSIS — K21.9 GASTROESOPHAGEAL REFLUX DISEASE, UNSPECIFIED WHETHER ESOPHAGITIS PRESENT: ICD-10-CM

## 2022-09-30 DIAGNOSIS — E04.1 THYROID NODULE: ICD-10-CM

## 2022-09-30 DIAGNOSIS — G43.909 MIGRAINE WITHOUT STATUS MIGRAINOSUS, NOT INTRACTABLE, UNSPECIFIED MIGRAINE TYPE: Primary | ICD-10-CM

## 2022-09-30 PROCEDURE — 99214 OFFICE O/P EST MOD 30 MIN: CPT | Performed by: FAMILY MEDICINE

## 2022-10-03 ENCOUNTER — HOSPITAL ENCOUNTER (OUTPATIENT)
Age: 37
Discharge: HOME | End: 2022-10-03
Payer: MEDICAID

## 2022-10-03 VITALS
SYSTOLIC BLOOD PRESSURE: 137 MMHG | DIASTOLIC BLOOD PRESSURE: 87 MMHG | OXYGEN SATURATION: 99 % | TEMPERATURE: 99 F | HEART RATE: 108 BPM | RESPIRATION RATE: 20 BRPM

## 2022-10-03 DIAGNOSIS — K21.00 GASTROESOPHAGEAL REFLUX DISEASE WITH ESOPHAGITIS WITHOUT HEMORRHAGE: Primary | ICD-10-CM

## 2022-10-03 LAB — B-HCG UR QL: NEGATIVE

## 2022-10-03 PROCEDURE — 99213 OFFICE O/P EST LOW 20 MIN: CPT

## 2022-10-03 PROCEDURE — 81025 URINE PREGNANCY TEST: CPT

## 2022-10-03 RX ORDER — DICYCLOMINE HYDROCHLORIDE 10 MG/5ML
20 SOLUTION ORAL ONCE
Status: COMPLETED | OUTPATIENT
Start: 2022-10-03 | End: 2022-10-03

## 2022-10-03 RX ORDER — LIDOCAINE HYDROCHLORIDE 20 MG/ML
10 SOLUTION OROPHARYNGEAL ONCE
Status: COMPLETED | OUTPATIENT
Start: 2022-10-03 | End: 2022-10-03

## 2022-10-03 RX ORDER — MAGNESIUM HYDROXIDE/ALUMINUM HYDROXICE/SIMETHICONE 120; 1200; 1200 MG/30ML; MG/30ML; MG/30ML
30 SUSPENSION ORAL ONCE
Status: COMPLETED | OUTPATIENT
Start: 2022-10-03 | End: 2022-10-03

## 2022-10-04 NOTE — ED INITIAL ASSESSMENT (HPI)
Patient reports a horrible dull burning pain in her stomach since Thursday.  + decreased appetite, nausea. Denies emesis. Denies blood in stool. Reports hx of gi issues.

## 2022-10-06 ENCOUNTER — MED REC SCAN ONLY (OUTPATIENT)
Dept: INTEGRATIVE MEDICINE | Facility: CLINIC | Age: 37
End: 2022-10-06

## 2022-10-31 ENCOUNTER — TELEMEDICINE (OUTPATIENT)
Dept: SURGERY | Facility: CLINIC | Age: 37
End: 2022-10-31

## 2022-10-31 VITALS — BODY MASS INDEX: 28 KG/M2 | WEIGHT: 153 LBS

## 2022-10-31 DIAGNOSIS — R63.5 WEIGHT GAIN: ICD-10-CM

## 2022-10-31 DIAGNOSIS — E78.5 DYSLIPIDEMIA: Primary | ICD-10-CM

## 2022-10-31 DIAGNOSIS — Z72.0 TOBACCO ABUSE: ICD-10-CM

## 2022-10-31 DIAGNOSIS — E66.9 OBESITY (BMI 30-39.9): ICD-10-CM

## 2022-10-31 DIAGNOSIS — F41.9 ANXIETY: ICD-10-CM

## 2022-10-31 DIAGNOSIS — D64.9 ANEMIA, UNSPECIFIED TYPE: ICD-10-CM

## 2022-10-31 RX ORDER — CLONAZEPAM 0.5 MG/1
0.5 TABLET ORAL DAILY
COMMUNITY
End: 2022-10-31

## 2022-10-31 NOTE — PATIENT INSTRUCTIONS
Bike  3 days/week- 20 minute sessions    Phentermine  Phentermine + topiramate   Wellbutrin  Metformin    Daily Calories:  120-174 lbs: 1200 calories/day. 175-219 lbs: 1500 calories/day. 220-249 lbs: 1800 calories/day. 250 lbs or more: 2,000 calories/day. 55 grams protein/day.  grams carbs/day. Consider PGX supplement -1-2 capsules 20 minutes prior to dinner with a big glass of water. I recommend a whole food, plant powered diet with low glycemic index:     Aim for 3 meals a day and 1-2 snacks as needed. Aim for a protein + produce at each meal time. Keep meals between 7 am and 7 pm.     Breakfast ideas:  1. Fruit and nuts/seeds. 2. Eggs scrambled with vegetables, cottage cheese. 3. Oatmeal (stovetop), cinnamon, 2 tbsp flaxseed, berries. 4. Protein shake + fruit. (1 scoop with water/ice). Annalisa Singh is a good brand. Snacks:  Raw vegetables and hummus, apples and peanut butter, nuts, seeds, fruit. Use the Healthy Plate method for lunch and dinner:  1/2 right side of plate non-starchy vegetables. Bottom left 1/4 plate protein. Top left 1/4 starch as desired. Aim for a total of:  2 fruits a day (avocado, tomato, citrus/oranges, apples, berries)    4 non-starchy vegetables (handful) (greens, peppers, onions, garlic, broccoli, cauliflower, etc.)    0-2 starches (oatmeal, sweet potato, carrots, brown rice, etc). 3 protein per day (fish, seafood, meat, or plants: salmon, nuts, seeds, shrimp, chicken, turkey, beans, lentils, chickpeas, etc).     2 healthy fats (avocado, avocado oil, olives, olive oil, salmon, nuts, seeds)

## 2022-11-22 ENCOUNTER — TELEMEDICINE (OUTPATIENT)
Dept: INTEGRATIVE MEDICINE | Facility: CLINIC | Age: 37
End: 2022-11-22

## 2022-11-22 ENCOUNTER — TELEPHONE (OUTPATIENT)
Dept: INTEGRATIVE MEDICINE | Facility: CLINIC | Age: 37
End: 2022-11-22

## 2022-11-22 ENCOUNTER — MED REC SCAN ONLY (OUTPATIENT)
Dept: INTEGRATIVE MEDICINE | Facility: CLINIC | Age: 37
End: 2022-11-22

## 2022-11-22 DIAGNOSIS — R63.5 WEIGHT GAIN: ICD-10-CM

## 2022-11-22 DIAGNOSIS — R19.04 LEFT LOWER QUADRANT ABDOMINAL MASS: Primary | ICD-10-CM

## 2022-11-22 DIAGNOSIS — R22.2 MASS OF CHEST WALL, LEFT: ICD-10-CM

## 2022-11-22 PROCEDURE — 99214 OFFICE O/P EST MOD 30 MIN: CPT | Performed by: FAMILY MEDICINE

## 2022-12-08 ENCOUNTER — HOSPITAL ENCOUNTER (OUTPATIENT)
Age: 37
Discharge: HOME | End: 2022-12-08
Payer: MEDICAID

## 2022-12-08 VITALS
SYSTOLIC BLOOD PRESSURE: 130 MMHG | RESPIRATION RATE: 18 BRPM | DIASTOLIC BLOOD PRESSURE: 83 MMHG | HEART RATE: 95 BPM | OXYGEN SATURATION: 99 % | TEMPERATURE: 98 F

## 2022-12-08 DIAGNOSIS — R22.9 LUMPS ON THE SKIN: Primary | ICD-10-CM

## 2022-12-08 PROCEDURE — 99213 OFFICE O/P EST LOW 20 MIN: CPT

## 2022-12-08 PROCEDURE — 99212 OFFICE O/P EST SF 10 MIN: CPT

## 2022-12-08 NOTE — ED INITIAL ASSESSMENT (HPI)
Per pt has been having \"lumps\" around sternum area and right rib area. Reports can palpate lumps for about 5-6 months. Increase in pain reported.

## 2022-12-08 NOTE — DISCHARGE INSTRUCTIONS
Go to scheduled US of chest and abdomen Schedule mamogram  Follow up  with your doctor  Return for concerns

## 2022-12-09 ENCOUNTER — LAB ENCOUNTER (OUTPATIENT)
Dept: LAB | Age: 37
End: 2022-12-09
Attending: NURSE PRACTITIONER
Payer: MEDICAID

## 2022-12-09 DIAGNOSIS — D64.9 ANEMIA, UNSPECIFIED TYPE: ICD-10-CM

## 2022-12-09 DIAGNOSIS — Z72.0 TOBACCO ABUSE: ICD-10-CM

## 2022-12-09 DIAGNOSIS — R63.5 WEIGHT GAIN: ICD-10-CM

## 2022-12-09 DIAGNOSIS — E04.1 THYROID NODULE: ICD-10-CM

## 2022-12-09 DIAGNOSIS — L65.9 HAIR LOSS: ICD-10-CM

## 2022-12-09 DIAGNOSIS — E61.1 IRON DEFICIENCY: ICD-10-CM

## 2022-12-09 DIAGNOSIS — E78.5 DYSLIPIDEMIA: ICD-10-CM

## 2022-12-09 DIAGNOSIS — E66.9 OBESITY (BMI 30-39.9): ICD-10-CM

## 2022-12-09 DIAGNOSIS — F41.9 ANXIETY: ICD-10-CM

## 2022-12-09 LAB
DEPRECATED HBV CORE AB SER IA-ACNC: 127.9 NG/ML
INSULIN SERPL-ACNC: 12.3 MU/L (ref 3–25)
IRON SATN MFR SERPL: 25 %
IRON SERPL-MCNC: 73 UG/DL
T4 FREE SERPL-MCNC: 1.3 NG/DL (ref 0.8–1.7)
THYROGLOB SERPL-MCNC: <15 U/ML (ref ?–60)
THYROPEROXIDASE AB SERPL-ACNC: 48 U/ML (ref ?–60)
TIBC SERPL-MCNC: 295 UG/DL (ref 240–450)
TRANSFERRIN SERPL-MCNC: 198 MG/DL (ref 200–360)
TSI SER-ACNC: 1.85 MIU/ML (ref 0.36–3.74)

## 2022-12-09 PROCEDURE — 86800 THYROGLOBULIN ANTIBODY: CPT

## 2022-12-09 PROCEDURE — 83525 ASSAY OF INSULIN: CPT

## 2022-12-09 PROCEDURE — 86376 MICROSOMAL ANTIBODY EACH: CPT

## 2022-12-09 PROCEDURE — 84439 ASSAY OF FREE THYROXINE: CPT

## 2022-12-09 PROCEDURE — 36415 COLL VENOUS BLD VENIPUNCTURE: CPT

## 2022-12-09 PROCEDURE — 82397 CHEMILUMINESCENT ASSAY: CPT

## 2022-12-09 PROCEDURE — 82728 ASSAY OF FERRITIN: CPT

## 2022-12-09 PROCEDURE — 83540 ASSAY OF IRON: CPT

## 2022-12-09 PROCEDURE — 84443 ASSAY THYROID STIM HORMONE: CPT

## 2022-12-09 PROCEDURE — 84466 ASSAY OF TRANSFERRIN: CPT

## 2022-12-12 LAB — LEPTIN: 10.8 NG/ML

## 2022-12-13 ENCOUNTER — TELEPHONE (OUTPATIENT)
Dept: INTEGRATIVE MEDICINE | Facility: CLINIC | Age: 37
End: 2022-12-13

## 2022-12-13 NOTE — TELEPHONE ENCOUNTER
Patient called office to inquire about scheduling chest ultrasound. Patient states that she has not been able to schedule the ultrasound of her chest at [facility]. Patient would like to schedule ultrasound at [facility]. RN gave patient number for central scheduling to schedule her ultrasound. Patient verbalized understanding and agreement with the plan.

## 2023-01-11 ENCOUNTER — MED REC SCAN ONLY (OUTPATIENT)
Dept: INTEGRATIVE MEDICINE | Facility: CLINIC | Age: 38
End: 2023-01-11

## 2023-01-15 ENCOUNTER — HOSPITAL ENCOUNTER (OUTPATIENT)
Dept: ULTRASOUND IMAGING | Age: 38
Discharge: HOME | End: 2023-01-15
Attending: FAMILY MEDICINE
Payer: MEDICAID

## 2023-01-15 ENCOUNTER — HOSPITAL ENCOUNTER (OUTPATIENT)
Dept: ULTRASOUND IMAGING | Age: 38
End: 2023-01-15
Attending: FAMILY MEDICINE
Payer: MEDICAID

## 2023-01-15 DIAGNOSIS — R19.04 LEFT LOWER QUADRANT ABDOMINAL MASS: ICD-10-CM

## 2023-01-15 DIAGNOSIS — R22.2 MASS OF CHEST WALL, LEFT: ICD-10-CM

## 2023-01-15 PROCEDURE — 76705 ECHO EXAM OF ABDOMEN: CPT | Performed by: FAMILY MEDICINE

## 2023-01-15 PROCEDURE — 76604 US EXAM CHEST: CPT | Performed by: FAMILY MEDICINE

## 2023-01-16 ENCOUNTER — HOSPITAL ENCOUNTER (OUTPATIENT)
Age: 38
Discharge: HOME | End: 2023-01-16
Attending: EMERGENCY MEDICINE
Payer: MEDICAID

## 2023-01-16 VITALS
DIASTOLIC BLOOD PRESSURE: 78 MMHG | OXYGEN SATURATION: 100 % | TEMPERATURE: 98 F | RESPIRATION RATE: 18 BRPM | SYSTOLIC BLOOD PRESSURE: 122 MMHG | HEART RATE: 105 BPM

## 2023-01-16 DIAGNOSIS — L02.419 AXILLARY ABSCESS: Primary | ICD-10-CM

## 2023-01-16 PROCEDURE — 99213 OFFICE O/P EST LOW 20 MIN: CPT

## 2023-01-16 RX ORDER — CLINDAMYCIN HYDROCHLORIDE 150 MG/1
450 CAPSULE ORAL 3 TIMES DAILY
Qty: 90 CAPSULE | Refills: 0 | Status: SHIPPED | OUTPATIENT
Start: 2023-01-16 | End: 2023-01-26

## 2023-01-17 NOTE — DISCHARGE INSTRUCTIONS
Thank you for visiting our immediate care for your health care needs. Please follow up with dermatology as referred. If you have any additional problems please return to the immediate care. Please take clindamycin as prescribed. Return to immediate care/emergency department if you change your mind about incision and drainage.

## 2023-01-30 ENCOUNTER — OFFICE VISIT (OUTPATIENT)
Dept: DERMATOLOGY CLINIC | Facility: CLINIC | Age: 38
End: 2023-01-30

## 2023-01-30 DIAGNOSIS — L73.2 HIDRADENITIS SUPPURATIVA OF LEFT AXILLA: ICD-10-CM

## 2023-01-30 DIAGNOSIS — L73.2 HIDRADENITIS SUPPURATIVA OF RIGHT AXILLA: ICD-10-CM

## 2023-01-30 PROCEDURE — 99204 OFFICE O/P NEW MOD 45 MIN: CPT | Performed by: STUDENT IN AN ORGANIZED HEALTH CARE EDUCATION/TRAINING PROGRAM

## 2023-01-30 RX ORDER — CLINDAMYCIN PHOSPHATE 10 MG/G
1 GEL TOPICAL 2 TIMES DAILY
Qty: 30 G | Refills: 0 | Status: SHIPPED | OUTPATIENT
Start: 2023-01-30

## 2023-01-30 NOTE — PATIENT INSTRUCTIONS
-Use Hibaclens daily. Once done with hibaclens switch to Acne foaming cleanser. Be careful as it could bleach towels.    -OK to use LMX or EMLA for topical numbing if injection needed in future

## 2023-02-09 ENCOUNTER — PATIENT MESSAGE (OUTPATIENT)
Dept: DERMATOLOGY CLINIC | Facility: CLINIC | Age: 38
End: 2023-02-09

## 2023-02-09 NOTE — TELEPHONE ENCOUNTER
LOV 1/30/23 - Dr. Cee Alford - please see pt's message and photos below. Please advise if pt should come back in for a follow up. Thank you.

## 2023-02-13 ENCOUNTER — HOSPITAL ENCOUNTER (OUTPATIENT)
Age: 38
Discharge: HOME | End: 2023-02-13
Payer: MEDICAID

## 2023-02-13 ENCOUNTER — APPOINTMENT (OUTPATIENT)
Dept: ULTRASOUND IMAGING | Age: 38
End: 2023-02-13
Attending: NURSE PRACTITIONER
Payer: MEDICAID

## 2023-02-13 VITALS
RESPIRATION RATE: 20 BRPM | TEMPERATURE: 99 F | SYSTOLIC BLOOD PRESSURE: 127 MMHG | OXYGEN SATURATION: 98 % | DIASTOLIC BLOOD PRESSURE: 84 MMHG | HEART RATE: 100 BPM

## 2023-02-13 DIAGNOSIS — N94.9 VAGINAL DISCOMFORT: ICD-10-CM

## 2023-02-13 DIAGNOSIS — N88.8 NABOTHIAN CYST: Primary | ICD-10-CM

## 2023-02-13 LAB
B-HCG UR QL: NEGATIVE
BILIRUB UR QL STRIP: NEGATIVE
CLARITY UR: CLEAR
COLOR UR: YELLOW
GLUCOSE BLDC GLUCOMTR-MCNC: 119 MG/DL (ref 70–99)
GLUCOSE UR STRIP-MCNC: NEGATIVE MG/DL
HGB UR QL STRIP: NEGATIVE
KETONES UR STRIP-MCNC: NEGATIVE MG/DL
NITRITE UR QL STRIP: NEGATIVE
PH UR STRIP: 7 [PH]
PROT UR STRIP-MCNC: NEGATIVE MG/DL
SP GR UR STRIP: 1.02
UROBILINOGEN UR STRIP-ACNC: <2 MG/DL

## 2023-02-13 PROCEDURE — 87106 FUNGI IDENTIFICATION YEAST: CPT | Performed by: NURSE PRACTITIONER

## 2023-02-13 PROCEDURE — 99214 OFFICE O/P EST MOD 30 MIN: CPT

## 2023-02-13 PROCEDURE — 76830 TRANSVAGINAL US NON-OB: CPT | Performed by: NURSE PRACTITIONER

## 2023-02-13 PROCEDURE — 87491 CHLMYD TRACH DNA AMP PROBE: CPT | Performed by: NURSE PRACTITIONER

## 2023-02-13 PROCEDURE — 87086 URINE CULTURE/COLONY COUNT: CPT | Performed by: NURSE PRACTITIONER

## 2023-02-13 PROCEDURE — 76856 US EXAM PELVIC COMPLETE: CPT | Performed by: NURSE PRACTITIONER

## 2023-02-13 PROCEDURE — 87808 TRICHOMONAS ASSAY W/OPTIC: CPT | Performed by: NURSE PRACTITIONER

## 2023-02-13 PROCEDURE — 81025 URINE PREGNANCY TEST: CPT

## 2023-02-13 PROCEDURE — 81002 URINALYSIS NONAUTO W/O SCOPE: CPT

## 2023-02-13 PROCEDURE — 87591 N.GONORRHOEAE DNA AMP PROB: CPT | Performed by: NURSE PRACTITIONER

## 2023-02-13 PROCEDURE — 93975 VASCULAR STUDY: CPT | Performed by: NURSE PRACTITIONER

## 2023-02-13 PROCEDURE — 82962 GLUCOSE BLOOD TEST: CPT

## 2023-02-13 PROCEDURE — 87205 SMEAR GRAM STAIN: CPT | Performed by: NURSE PRACTITIONER

## 2023-02-13 NOTE — ED INITIAL ASSESSMENT (HPI)
PATIENT ARRIVED AMBULATORY TO ROOM C/O SYMPTOMS THAT STARTED YESTERDAY. +LOWER ABDOMINAL PAIN RADIATING TO THE LOWER BACK. +YELLOW VAGINAL DISCHARGE. +DIARRHEA. +URINARY FREQUENCY. NO PAIN WHEN URINATING. NO FEVERS.

## 2023-02-14 LAB
C TRACH DNA SPEC QL NAA+PROBE: NEGATIVE
N GONORRHOEA DNA SPEC QL NAA+PROBE: NEGATIVE

## 2023-02-14 NOTE — DISCHARGE INSTRUCTIONS
Cultures are pending. Follow-up with gynecology. Ibuprofen or Aleve as needed for pain. Warm packs to abdomen.

## 2023-02-16 LAB
GENITAL VAGINOSIS SCREEN: NEGATIVE
TRICHOMONAS SCREEN: NEGATIVE

## 2023-03-13 ENCOUNTER — E-VISIT (OUTPATIENT)
Dept: TELEHEALTH | Age: 38
End: 2023-03-13

## 2023-03-13 DIAGNOSIS — J02.9 SORE THROAT: Primary | ICD-10-CM

## 2023-03-13 PROCEDURE — 99421 OL DIG E/M SVC 5-10 MIN: CPT | Performed by: PHYSICIAN ASSISTANT

## 2023-03-28 ENCOUNTER — MED REC SCAN ONLY (OUTPATIENT)
Dept: INTEGRATIVE MEDICINE | Facility: CLINIC | Age: 38
End: 2023-03-28

## 2023-03-30 ENCOUNTER — OFFICE VISIT (OUTPATIENT)
Dept: DERMATOLOGY CLINIC | Facility: CLINIC | Age: 38
End: 2023-03-30

## 2023-03-30 DIAGNOSIS — L98.9 PAINFUL SKIN LESION: ICD-10-CM

## 2023-03-30 DIAGNOSIS — L73.2 HIDRADENITIS: Primary | ICD-10-CM

## 2023-03-30 PROCEDURE — 11900 INJECT SKIN LESIONS </W 7: CPT | Performed by: STUDENT IN AN ORGANIZED HEALTH CARE EDUCATION/TRAINING PROGRAM

## 2023-03-30 PROCEDURE — 99213 OFFICE O/P EST LOW 20 MIN: CPT | Performed by: STUDENT IN AN ORGANIZED HEALTH CARE EDUCATION/TRAINING PROGRAM

## 2023-03-30 RX ORDER — FLUCONAZOLE 150 MG/1
150 TABLET ORAL
COMMUNITY
Start: 2022-12-18

## 2023-03-30 RX ORDER — LIDOCAINE HYDROCHLORIDE 20 MG/ML
SOLUTION OROPHARYNGEAL
COMMUNITY
Start: 2023-03-14

## 2023-03-30 RX ORDER — NITROFURANTOIN 25; 75 MG/1; MG/1
CAPSULE ORAL
COMMUNITY
Start: 2022-12-20

## 2023-03-30 RX ORDER — METRONIDAZOLE 7.5 MG/G
5 GEL VAGINAL NIGHTLY
COMMUNITY
Start: 2022-12-23

## 2023-03-30 RX ORDER — DOXYCYCLINE HYCLATE 100 MG/1
100 CAPSULE ORAL 2 TIMES DAILY
COMMUNITY
Start: 2023-01-19

## 2023-03-30 RX ORDER — FAMOTIDINE 20 MG/1
20 TABLET, FILM COATED ORAL 2 TIMES DAILY
COMMUNITY
Start: 2022-10-17

## 2023-03-30 RX ORDER — PREDNISONE 50 MG/1
50 TABLET ORAL DAILY
COMMUNITY
Start: 2022-10-17

## 2023-03-30 RX ORDER — FLUCONAZOLE 100 MG/1
TABLET ORAL
COMMUNITY
Start: 2022-11-29

## 2023-03-30 RX ORDER — AMOXICILLIN AND CLAVULANATE POTASSIUM 875; 125 MG/1; MG/1
1 TABLET, FILM COATED ORAL EVERY 12 HOURS
COMMUNITY
Start: 2022-11-29

## 2023-03-30 RX ORDER — SERTRALINE HYDROCHLORIDE 25 MG/1
25 TABLET, FILM COATED ORAL EVERY MORNING
COMMUNITY
Start: 2022-12-03

## 2023-03-30 RX ORDER — OMEPRAZOLE 20 MG/1
20 CAPSULE, DELAYED RELEASE ORAL
COMMUNITY
Start: 2022-10-04

## 2023-03-30 RX ORDER — DIPHENHYDRAMINE HCL 25 MG/1
TABLET ORAL
COMMUNITY
Start: 2022-10-17

## 2023-03-30 RX ORDER — NEOMYCIN SULFATE, POLYMYXIN B SULFATE AND HYDROCORTISONE 10; 3.5; 1 MG/ML; MG/ML; [USP'U]/ML
SUSPENSION/ DROPS AURICULAR (OTIC)
COMMUNITY
Start: 2022-10-12

## 2023-03-30 RX ORDER — POLYMYXIN B SULFATE AND TRIMETHOPRIM 1; 10000 MG/ML; [USP'U]/ML
1 SOLUTION OPHTHALMIC EVERY 6 HOURS
COMMUNITY
Start: 2023-02-18

## 2023-04-22 ENCOUNTER — LAB ENCOUNTER (OUTPATIENT)
Dept: LAB | Age: 38
End: 2023-04-22
Attending: FAMILY MEDICINE
Payer: MEDICAID

## 2023-04-22 DIAGNOSIS — F41.9 ANXIETY: ICD-10-CM

## 2023-04-22 DIAGNOSIS — Z00.00 WELL ADULT EXAM: ICD-10-CM

## 2023-04-22 DIAGNOSIS — L73.2 HIDRADENITIS SUPPURATIVA: ICD-10-CM

## 2023-04-22 LAB
ALBUMIN SERPL-MCNC: 3.8 G/DL (ref 3.4–5)
ALBUMIN/GLOB SERPL: 1 {RATIO} (ref 1–2)
ALP LIVER SERPL-CCNC: 73 U/L
ALT SERPL-CCNC: 22 U/L
ANION GAP SERPL CALC-SCNC: 8 MMOL/L (ref 0–18)
AST SERPL-CCNC: 12 U/L (ref 15–37)
BASOPHILS # BLD AUTO: 0.06 X10(3) UL (ref 0–0.2)
BASOPHILS NFR BLD AUTO: 0.7 %
BILIRUB SERPL-MCNC: 0.3 MG/DL (ref 0.1–2)
BUN BLD-MCNC: 11 MG/DL (ref 7–18)
BUN/CREAT SERPL: 14.5 (ref 10–20)
CALCIUM BLD-MCNC: 9.6 MG/DL (ref 8.5–10.1)
CHLORIDE SERPL-SCNC: 110 MMOL/L (ref 98–112)
CO2 SERPL-SCNC: 24 MMOL/L (ref 21–32)
CREAT BLD-MCNC: 0.76 MG/DL
DEPRECATED RDW RBC AUTO: 41.5 FL (ref 35.1–46.3)
EOSINOPHIL # BLD AUTO: 0.15 X10(3) UL (ref 0–0.7)
EOSINOPHIL NFR BLD AUTO: 1.7 %
ERYTHROCYTE [DISTWIDTH] IN BLOOD BY AUTOMATED COUNT: 13.2 % (ref 11–15)
FASTING STATUS PATIENT QL REPORTED: YES
FOLATE SERPL-MCNC: 15.6 NG/ML (ref 8.7–?)
GFR SERPLBLD BASED ON 1.73 SQ M-ARVRAT: 104 ML/MIN/1.73M2 (ref 60–?)
GLOBULIN PLAS-MCNC: 4 G/DL (ref 2.8–4.4)
GLUCOSE BLD-MCNC: 108 MG/DL (ref 70–99)
HCT VFR BLD AUTO: 45.7 %
HGB BLD-MCNC: 15.3 G/DL
IMM GRANULOCYTES # BLD AUTO: 0.02 X10(3) UL (ref 0–1)
IMM GRANULOCYTES NFR BLD: 0.2 %
LYMPHOCYTES # BLD AUTO: 2.17 X10(3) UL (ref 1–4)
LYMPHOCYTES NFR BLD AUTO: 24.2 %
MCH RBC QN AUTO: 28.7 PG (ref 26–34)
MCHC RBC AUTO-ENTMCNC: 33.5 G/DL (ref 31–37)
MCV RBC AUTO: 85.6 FL
MONOCYTES # BLD AUTO: 0.64 X10(3) UL (ref 0.1–1)
MONOCYTES NFR BLD AUTO: 7.1 %
NEUTROPHILS # BLD AUTO: 5.92 X10 (3) UL (ref 1.5–7.7)
NEUTROPHILS # BLD AUTO: 5.92 X10(3) UL (ref 1.5–7.7)
NEUTROPHILS NFR BLD AUTO: 66.1 %
OSMOLALITY SERPL CALC.SUM OF ELEC: 294 MOSM/KG (ref 275–295)
PLATELET # BLD AUTO: 244 10(3)UL (ref 150–450)
POTASSIUM SERPL-SCNC: 4.1 MMOL/L (ref 3.5–5.1)
PROT SERPL-MCNC: 7.8 G/DL (ref 6.4–8.2)
RBC # BLD AUTO: 5.34 X10(6)UL
SODIUM SERPL-SCNC: 142 MMOL/L (ref 136–145)
VIT B12 SERPL-MCNC: 309 PG/ML (ref 193–986)
VIT D+METAB SERPL-MCNC: 24.1 NG/ML (ref 30–100)
WBC # BLD AUTO: 9 X10(3) UL (ref 4–11)

## 2023-04-22 PROCEDURE — 36415 COLL VENOUS BLD VENIPUNCTURE: CPT

## 2023-04-22 PROCEDURE — 85025 COMPLETE CBC W/AUTO DIFF WBC: CPT

## 2023-04-22 PROCEDURE — 82746 ASSAY OF FOLIC ACID SERUM: CPT

## 2023-04-22 PROCEDURE — 83735 ASSAY OF MAGNESIUM: CPT

## 2023-04-22 PROCEDURE — 80053 COMPREHEN METABOLIC PANEL: CPT

## 2023-04-22 PROCEDURE — 82306 VITAMIN D 25 HYDROXY: CPT

## 2023-04-22 PROCEDURE — 82607 VITAMIN B-12: CPT

## 2023-04-22 PROCEDURE — 84630 ASSAY OF ZINC: CPT

## 2023-04-25 LAB — ZINC, RBC: 1249 UG/DL

## 2023-04-26 LAB — MAGNESIUM RBC: 4.8 MG/DL

## 2023-04-27 ENCOUNTER — TELEMEDICINE (OUTPATIENT)
Dept: INTEGRATIVE MEDICINE | Facility: CLINIC | Age: 38
End: 2023-04-27

## 2023-04-27 DIAGNOSIS — R14.1 GAS PAIN: Primary | ICD-10-CM

## 2023-04-27 DIAGNOSIS — L73.2 HYDRADENITIS: ICD-10-CM

## 2023-04-27 DIAGNOSIS — E55.9 VITAMIN D DEFICIENCY: ICD-10-CM

## 2023-04-27 DIAGNOSIS — E53.8 B12 DEFICIENCY: ICD-10-CM

## 2023-04-27 PROCEDURE — 99213 OFFICE O/P EST LOW 20 MIN: CPT | Performed by: FAMILY MEDICINE

## 2023-05-02 ENCOUNTER — MED REC SCAN ONLY (OUTPATIENT)
Dept: INTEGRATIVE MEDICINE | Facility: CLINIC | Age: 38
End: 2023-05-02

## 2023-05-02 RX ORDER — DOXYCYCLINE HYCLATE 100 MG/1
100 CAPSULE ORAL 2 TIMES DAILY
Qty: 60 CAPSULE | Refills: 2 | Status: SHIPPED | OUTPATIENT
Start: 2023-05-02

## 2023-05-04 ENCOUNTER — OFFICE VISIT (OUTPATIENT)
Dept: DERMATOLOGY CLINIC | Facility: CLINIC | Age: 38
End: 2023-05-04

## 2023-05-04 DIAGNOSIS — L03.90 CELLULITIS, UNSPECIFIED CELLULITIS SITE: ICD-10-CM

## 2023-05-04 DIAGNOSIS — L73.2 HIDRADENITIS: Primary | ICD-10-CM

## 2023-05-04 PROCEDURE — 99213 OFFICE O/P EST LOW 20 MIN: CPT | Performed by: STUDENT IN AN ORGANIZED HEALTH CARE EDUCATION/TRAINING PROGRAM

## 2023-05-04 RX ORDER — CLINDAMYCIN HYDROCHLORIDE 300 MG/1
300 CAPSULE ORAL 3 TIMES DAILY
COMMUNITY
Start: 2023-05-01

## 2023-05-04 RX ORDER — CEPHALEXIN 500 MG/1
500 CAPSULE ORAL 4 TIMES DAILY
COMMUNITY
Start: 2023-05-01

## 2023-05-22 ENCOUNTER — PATIENT MESSAGE (OUTPATIENT)
Dept: DERMATOLOGY CLINIC | Facility: CLINIC | Age: 38
End: 2023-05-22

## 2023-05-22 NOTE — TELEPHONE ENCOUNTER
Dr. Bunny Telles, please see pt's msg/photo, she was last seen 5/4/23, per note:    \"#Hidradenitis suppurativa  #Cellulitis  - Hold doxycyline at this time   - Resume Clindamycin 300mg three times daily. If unable to tolerate ok to hold as long as patient notifies us if redness and pain recurs. - Hold ILK given concern of cellulitis     Please advise on her msg, do we schedule for injections or are we still holding?

## 2023-05-22 NOTE — TELEPHONE ENCOUNTER
From: Yesika Gamble  To: Delmer Miller MD  Sent: 5/22/2023 12:06 PM CDT  Subject: Spot where cellulitis was    I was wondering if I can call to come in for injections and also have you look at where the cellulitis was, it's presentation is different than the normal hs. It's lumpy* with bluish marks. I included a picture. It seems like it maybe a couple small spots. It dosen't have the normal soft middle like other HS lessons.

## 2023-06-05 ENCOUNTER — MED REC SCAN ONLY (OUTPATIENT)
Dept: INTEGRATIVE MEDICINE | Facility: CLINIC | Age: 38
End: 2023-06-05

## 2023-06-23 ENCOUNTER — LAB ENCOUNTER (OUTPATIENT)
Dept: LAB | Age: 38
End: 2023-06-23
Attending: FAMILY MEDICINE
Payer: MEDICAID

## 2023-06-23 DIAGNOSIS — Z02.83 ENCOUNTER FOR DRUG SCREENING: ICD-10-CM

## 2023-06-23 DIAGNOSIS — R14.1 GAS PAIN: ICD-10-CM

## 2023-06-23 LAB
AMPHET UR QL SCN: NEGATIVE
BARBITURATES UR QL SCN: NEGATIVE
BENZODIAZ UR QL SCN: NEGATIVE
CANNABINOIDS UR QL SCN: NEGATIVE
COCAINE UR QL: NEGATIVE
CREAT UR-SCNC: 188 MG/DL
IGA SERPL-MCNC: 277 MG/DL (ref 70–312)
MDMA UR QL SCN: NEGATIVE
METHADONE UR QL SCN: NEGATIVE
OPIATES UR QL SCN: NEGATIVE
OXYCODONE UR QL SCN: NEGATIVE
PCP UR QL SCN: NEGATIVE

## 2023-06-23 PROCEDURE — 36415 COLL VENOUS BLD VENIPUNCTURE: CPT

## 2023-06-23 PROCEDURE — 80307 DRUG TEST PRSMV CHEM ANLYZR: CPT

## 2023-06-23 PROCEDURE — 86364 TISS TRNSGLTMNASE EA IG CLAS: CPT

## 2023-06-23 PROCEDURE — 82784 ASSAY IGA/IGD/IGG/IGM EACH: CPT

## 2023-06-26 LAB — TTG IGA SER-ACNC: 2.4 U/ML (ref ?–7)

## 2023-07-03 ENCOUNTER — TELEMEDICINE (OUTPATIENT)
Dept: INTEGRATIVE MEDICINE | Facility: CLINIC | Age: 38
End: 2023-07-03

## 2023-07-03 ENCOUNTER — PATIENT MESSAGE (OUTPATIENT)
Dept: INTEGRATIVE MEDICINE | Facility: CLINIC | Age: 38
End: 2023-07-03

## 2023-07-03 DIAGNOSIS — F17.200 SMOKING: ICD-10-CM

## 2023-07-03 DIAGNOSIS — F43.0 STRESS REACTION: ICD-10-CM

## 2023-07-03 DIAGNOSIS — K58.0 IRRITABLE BOWEL SYNDROME WITH DIARRHEA: Primary | ICD-10-CM

## 2023-07-03 RX ORDER — DICYCLOMINE HYDROCHLORIDE 10 MG/1
10 CAPSULE ORAL 4 TIMES DAILY
Qty: 40 CAPSULE | Refills: 0 | Status: SHIPPED | OUTPATIENT
Start: 2023-07-03 | End: 2023-07-13

## 2023-07-06 NOTE — TELEPHONE ENCOUNTER
Reached out to Bryson on [employer]. Per Bryson he has compleed his investigation and no further information was needed at this time.

## 2023-07-06 NOTE — TELEPHONE ENCOUNTER
From: Joseph Evans  To: Rosemary Jameson DO  Sent: 7/3/2023 9:47 AM CDT  Subject: Phone number for \"investigator\"    [address] [facility]  [phone]   Please double check the picture for the correct information.   You can also try and contact the [facility] office and ask to speak to his supervisor

## 2023-07-19 ENCOUNTER — MED REC SCAN ONLY (OUTPATIENT)
Dept: INTEGRATIVE MEDICINE | Facility: CLINIC | Age: 38
End: 2023-07-19

## 2023-08-08 ENCOUNTER — PATIENT MESSAGE (OUTPATIENT)
Dept: DERMATOLOGY CLINIC | Facility: CLINIC | Age: 38
End: 2023-08-08

## 2023-08-08 NOTE — TELEPHONE ENCOUNTER
Per DM, please schedule pt for injection on 8/9/23 at 3pm.  Pt confirmed appt. Pt advised to continue Doxycycline and not to start the Clindamycin.

## 2023-08-09 ENCOUNTER — OFFICE VISIT (OUTPATIENT)
Dept: DERMATOLOGY CLINIC | Facility: CLINIC | Age: 38
End: 2023-08-09

## 2023-08-09 DIAGNOSIS — L73.2 HIDRADENITIS SUPPURATIVA: Primary | ICD-10-CM

## 2023-08-09 PROCEDURE — 11900 INJECT SKIN LESIONS </W 7: CPT | Performed by: STUDENT IN AN ORGANIZED HEALTH CARE EDUCATION/TRAINING PROGRAM

## 2023-08-09 PROCEDURE — 99214 OFFICE O/P EST MOD 30 MIN: CPT | Performed by: STUDENT IN AN ORGANIZED HEALTH CARE EDUCATION/TRAINING PROGRAM

## 2023-08-09 RX ORDER — SERTRALINE HYDROCHLORIDE 25 MG/1
25 TABLET, FILM COATED ORAL EVERY MORNING
COMMUNITY
Start: 2023-07-19

## 2023-08-09 RX ORDER — SPIRONOLACTONE 25 MG/1
TABLET ORAL
Qty: 120 TABLET | Refills: 2 | Status: SHIPPED | OUTPATIENT
Start: 2023-08-09

## 2023-08-09 NOTE — PROGRESS NOTES
August 9, 2023    Established patient     CHIEF COMPLAINT: Hidradenitis suppurativa     HISTORY OF PRESENT ILLNESS: .    1. Hidradenitis suppurativa   Location: Bilateral underarms   Duration: 3 months  Signs and symptoms: Raised lesions  Current treatment: Doxycyline 100 mg, Kenalog injections    DERM HISTORY:  History of skin cancer: No  History of chronic skin disease/condition: Yes    FAMILY HISTORY:  History of melanoma: No  History of chronic skin disease/condition: No    History/Other:    REVIEW OF SYSTEMS:  Constitutional: Denies fever, chills, unintentional weight loss. Skin as per HPI    PAST MEDICAL HISTORY:  Past Medical History:   Diagnosis Date    Allergic rhinitis 2017    Ragweed grass hay dust    Anxiety     COPD (chronic obstructive pulmonary disease) ([facility])     It was an incedental finding i was never told about on a ct    Essential hypertension     During panic attacks    Fibromyalgia     Hyperlipidemia     Always beverley high on test    Migraines        Medications  Current Outpatient Medications   Medication Sig Dispense Refill    sertraline 25 MG Oral Tab Take 1 tablet (25 mg total) by mouth every morning. doxycycline 100 MG Oral Cap Take 1 capsule (100 mg total) by mouth 2 (two) times daily. Take with meals. Cannot become pregnant or breastfeed on this medicine. 60 capsule 2    BANOPHEN 25 MG Oral Tab       famotidine 20 MG Oral Tab Take 1 tablet (20 mg total) by mouth 2 (two) times daily. clonazePAM 0.5 MG Oral Tab Take 1 tablet (0.5 mg total) by mouth 2 (two) times daily as needed. Multiple Vitamin (MULTI-VITAMIN DAILY) Oral Tab Take by mouth. nicotine polacrilex 4 MG Mouth/Throat Gum Take 1 each (4 mg total) by mouth as needed for Smoking cessation. Weeks 1 to 6: Chew 1 piece of gum every 1 to 2 hours (maximum: 24 pieces/day); to increase chances of quitting, chew at least 9 pieces/day during the first 6 weeks.  Weeks 7 to 9: Chew 1 piece of gum every 2 to 4 hours (maximum: 24 pieces/day). Weeks 10 to 12: Chew 1 piece of gum every 4 to 8 hours (maximum: 24 pieces/day (Patient not taking: Reported on 5/4/2023) 90 each 1    metroNIDAZOLE 0.75 % Vaginal Gel Place 5 g vaginally nightly. (Patient not taking: Reported on 5/4/2023)      neomycin-polymyxin-hydrocortisone 3.5-21365-8 Otic Suspension  (Patient not taking: Reported on 5/4/2023)      Levalbuterol Tartrate 45 MCG/ACT Inhalation Aerosol Inhale 1-2 puffs into the lungs every 4 (four) hours as needed. (Patient not taking: Reported on 8/9/2023)      fluticasone propionate 50 MCG/ACT Nasal Suspension 2 sprays by Nasal route daily. (Patient not taking: Reported on 8/9/2023)      ibuprofen 400 MG Oral Tab Take 1 tablet (400 mg total) by mouth 2 (two) times daily. (Patient not taking: Reported on 8/9/2023)         Objective:    PHYSICAL EXAM:  General: awake, alert, no acute distress  Skin: Skin exam was performed today including the following: axillae. Pertinent findings include:   - R axilla with 3 interconnecting erythematous subcutaneous nodules    ASSESSMENT & PLAN:  Pathophysiology of diagnoses discussed with patient. Therapeutic options reviewed. Risks, benefits, and alternatives discussed with patient. Instructions reviewed at length. #Hidradenitis suppurativa  - Recommend spironolactone 25mg daily x 2 weeks, 50mg daily x 2 weeks, then 75mg daily x 2 weeks, then 100mg once daily as maintenance dose. Dispensed as 25mg tablets to allow for uptitration.  - Discussed off-label use for HS  - Explained potential risks of therapy, included but not limited to hyperkalemia, hypotension, breast tenderness/enlargement, irregular menses and black box warning regarding increased risk of malignancy at higher doses. Patient expressed understanding of these risks and would like trial of therapy. - Medications not safe in pregnancy/breastfeeding. Stop and notify us if you become pregnant or plan to become pregnant.     - Instructed to maintain hydration while on therapy and STOP during times of significant dehydration, as risk for electrolyte abnormality increases. - Intralesional Kenalog (triamcinolone) injection today     Concentration: 5 mg/mL  Site: R axilla  Total volume injected: 0.5    Patient was counseled on the possible side effects of injection: pain at site, infection, atrophy of skin, systemic absorption, hypopigmentation, loss of hair. Questions if any were addressed before proceeding with prep of the site with rubbing alcohol. Kenalog was injected with sterile syringe and 25 g needle. Patient tolerated well.      Return to clinic: 3 months or sooner if something concerning arises     Elizabeth Haney MD [employer] Counseling:  I discussed with the patient the risks of mycophenolate mofetil including but not limited to infection/immunosuppression, GI upset, hypokalemia, hypercholesterolemia, bone marrow suppression, lymphoproliferative disorders, malignancy, GI ulceration/bleed/perforation, colitis, interstitial lung disease, kidney failure, progressive multifocal leukoencephalopathy, and birth defects.  The patient understands that monitoring is required including a baseline creatinine and regular CBC testing. In addition, patient must alert us immediately if symptoms of infection or other concerning signs are noted.

## 2023-09-06 ENCOUNTER — NURSE TRIAGE (OUTPATIENT)
Dept: INTEGRATIVE MEDICINE | Facility: CLINIC | Age: 38
End: 2023-09-06

## 2023-09-06 ENCOUNTER — HOSPITAL ENCOUNTER (OUTPATIENT)
Age: 38
Discharge: HOME | End: 2023-09-06
Payer: MEDICAID

## 2023-09-06 ENCOUNTER — PATIENT MESSAGE (OUTPATIENT)
Dept: INTEGRATIVE MEDICINE | Facility: CLINIC | Age: 38
End: 2023-09-06

## 2023-09-06 VITALS
SYSTOLIC BLOOD PRESSURE: 133 MMHG | OXYGEN SATURATION: 99 % | RESPIRATION RATE: 18 BRPM | WEIGHT: 148 LBS | BODY MASS INDEX: 27.94 KG/M2 | HEIGHT: 61 IN | HEART RATE: 94 BPM | DIASTOLIC BLOOD PRESSURE: 84 MMHG | TEMPERATURE: 99 F

## 2023-09-06 DIAGNOSIS — B37.31 YEAST VAGINITIS: Primary | ICD-10-CM

## 2023-09-06 LAB
B-HCG UR QL: NEGATIVE
BILIRUB UR QL STRIP: NEGATIVE
COLOR UR: YELLOW
GLUCOSE UR STRIP-MCNC: NEGATIVE MG/DL
HGB UR QL STRIP: NEGATIVE
KETONES UR STRIP-MCNC: NEGATIVE MG/DL
LEUKOCYTE ESTERASE UR QL STRIP: NEGATIVE
NITRITE UR QL STRIP: NEGATIVE
PH UR STRIP: 6 [PH]
PROT UR STRIP-MCNC: NEGATIVE MG/DL
SP GR UR STRIP: <=1.005
UROBILINOGEN UR STRIP-ACNC: <2 MG/DL

## 2023-09-06 PROCEDURE — 81025 URINE PREGNANCY TEST: CPT | Performed by: NURSE PRACTITIONER

## 2023-09-06 PROCEDURE — 99213 OFFICE O/P EST LOW 20 MIN: CPT | Performed by: NURSE PRACTITIONER

## 2023-09-06 PROCEDURE — 81002 URINALYSIS NONAUTO W/O SCOPE: CPT | Performed by: NURSE PRACTITIONER

## 2023-09-06 RX ORDER — FLUCONAZOLE 150 MG/1
150 TABLET ORAL ONCE
Qty: 2 TABLET | Refills: 0 | Status: SHIPPED | OUTPATIENT
Start: 2023-09-06 | End: 2023-09-06

## 2023-09-06 NOTE — TELEPHONE ENCOUNTER
From: Gunnar Caba  To: Tami Lozano DO  Sent: 9/6/2023 1:31 PM CDT  Subject: Pelvic pain    Good afternoon, I have a swollen lymph node on the left side of my pubic bone along with a hydradenitits abscess near by. I started doxycycline 100 mg twice a day for the HS. I think I have a test infection and am worried about a UTI? Is there anyway you can send in test? My aunt passed away at 52 from cancer this morning so I really don't want to sit in urgent care. Your best guidance would be appreciated.

## 2023-09-06 NOTE — TELEPHONE ENCOUNTER
Patient advised to seek care at [facility]/schedule in office today for evaluation. Patient declined - states she does not like to go to [facility] because of a \"creepy\" doctor. Pt denies ever feeling unsafe/threatened. Patient accepted appointment today with Dr. Gisele Escalante at . States she will let RN know if she can make it for sure via Mychart or phone call. Reason for Disposition   Mild lower abdominal pain comes and goes (cramps) that lasts > 24 hours    Answer Assessment - Initial Assessment Questions  1. SYMPTOM: \"What's the main symptom you're concerned about? \" (e.g., pain, itching, dryness)      Patient has swollen lymph node near thigh/groin. Has skin abscess from HS. Self started on doxycycline. 2. LOCATION: \"Where is the located? \" (e.g., inside/outside, left/right)      Groin  3. ONSET: \"When did the  this start? \"      Yesterday  4. PAIN: \"Is there any pain? \" If Yes, ask: \"How bad is it? \" (Scale: 1-10; mild, moderate, severe)      Pain 4/10.   5. ITCHING: \"Is there any itching? \" If Yes, ask: \"How bad is it? \" (Scale: 1-10; mild, moderate, severe)      Vaginal itching with thick white discharge  6. CAUSE: \"What do you think is causing the discharge? \" \"Have you had the same problem before? What happened then? \"      Yeast infection   7. OTHER SYMPTOMS: \"Do you have any other symptoms? \" (e.g., fever, itching, vaginal bleeding, pain with urination, injury to genital area, vaginal foreign body)      No burning with urination, blood in urine, denies fever, HS abscess is almost gone. Patient says she has pain in her lower back. 8. PREGNANCY: \"Is there any chance you are pregnant? \" \"When was your last menstrual period? \"      No chance of pregnancy, August 18-23    Protocols used: Vaginal Symptoms-A-OH, Vaginal Qbkbthuhv-A-PO

## 2023-09-06 NOTE — DISCHARGE INSTRUCTIONS
Please continue to take the doxycycline that was prescribed. Close follow-up with primary care provider is recommended.

## 2023-09-06 NOTE — ED INITIAL ASSESSMENT (HPI)
Pt c/o swollen lymph node to groin. Taking doxy for hidradentis suppurativa. C/o white vaginal discharge. Concerned for Uti or yeast infection.

## 2023-10-18 ENCOUNTER — LAB ENCOUNTER (OUTPATIENT)
Dept: LAB | Age: 38
End: 2023-10-18
Attending: NURSE PRACTITIONER
Payer: MEDICAID

## 2023-10-18 ENCOUNTER — TELEMEDICINE (OUTPATIENT)
Dept: TELEHEALTH | Age: 38
End: 2023-10-18
Payer: MEDICAID

## 2023-10-18 DIAGNOSIS — N91.1 SECONDARY AMENORRHEA: ICD-10-CM

## 2023-10-18 DIAGNOSIS — N91.1 SECONDARY AMENORRHEA: Primary | ICD-10-CM

## 2023-10-18 LAB
B-HCG SERPL-ACNC: <1 MIU/ML
ESTRADIOL SERPL-MCNC: 291.7 PG/ML
PROLACTIN SERPL-MCNC: 25 NG/ML
TSI SER-ACNC: 0.87 MIU/ML (ref 0.36–3.74)

## 2023-10-18 PROCEDURE — 82670 ASSAY OF TOTAL ESTRADIOL: CPT

## 2023-10-18 PROCEDURE — 84702 CHORIONIC GONADOTROPIN TEST: CPT

## 2023-10-18 PROCEDURE — 36415 COLL VENOUS BLD VENIPUNCTURE: CPT

## 2023-10-18 PROCEDURE — 99213 OFFICE O/P EST LOW 20 MIN: CPT | Performed by: NURSE PRACTITIONER

## 2023-10-18 PROCEDURE — 84443 ASSAY THYROID STIM HORMONE: CPT

## 2023-10-18 PROCEDURE — 84146 ASSAY OF PROLACTIN: CPT

## 2023-10-19 NOTE — PROGRESS NOTES
Gagan Bui is a 40year old female. HPI:   Patient presents via Video Visit on Demand. Patient consents to conducting the visit virtually and acknowledges limitations without an in person examination. Patient states that she is concerned because she is 10 days late for her menses. She has always been extremely regular in her cycles. Patient states that she has taken at least 8 OTC Pregnancy tests which have remained Negative. Patient states that when she has been Pregnant in the past she has gotten immediate Positive OTC Pregnancy tests after the first missed day of her menses. Patient states that she has been under tremendous stress. She just lost a young Aunt to Cancer last week. Children with serious health concerns at this time. Patient suspects that this is a result of stress and grief and fatiguing by helping family members. Patient has no unusual new hair growth patterns. No hot flushes or night sweats. She is not over exercising. Patient states that she is extremely concerned. Patient is sexually active with her . Patient states that her PCP has no available appointment for over a month. Her OB/Gyne's next appt is available in December. Current Outpatient Medications   Medication Sig Dispense Refill    sertraline 25 MG Oral Tab Take 1 tablet (25 mg total) by mouth every morning. spironolactone 25 MG Oral Tab Take 25mg daily x 2 weeks, 50mg daily x 2 weeks, then 75mg daily x 2 weeks, then 100mg once daily 120 tablet 2    doxycycline 100 MG Oral Cap Take 1 capsule (100 mg total) by mouth 2 (two) times daily. Take with meals. Cannot become pregnant or breastfeed on this medicine. 60 capsule 2    nicotine polacrilex 4 MG Mouth/Throat Gum Take 1 each (4 mg total) by mouth as needed for Smoking cessation. Weeks 1 to 6: Chew 1 piece of gum every 1 to 2 hours (maximum: 24 pieces/day); to increase chances of quitting, chew at least 9 pieces/day during the first 6 weeks.  Weeks 7 to 9: Chew 1 piece of gum every 2 to 4 hours (maximum: 24 pieces/day). Weeks 10 to 12: Chew 1 piece of gum every 4 to 8 hours (maximum: 24 pieces/day (Patient not taking: Reported on 5/4/2023) 90 each 1    BANOPHEN 25 MG Oral Tab       famotidine 20 MG Oral Tab Take 1 tablet (20 mg total) by mouth 2 (two) times daily. metroNIDAZOLE 0.75 % Vaginal Gel Place 5 g vaginally nightly. (Patient not taking: Reported on 5/4/2023)      neomycin-polymyxin-hydrocortisone 3.5-30327-5 Otic Suspension  (Patient not taking: Reported on 5/4/2023)      clonazePAM 0.5 MG Oral Tab Take 1 tablet (0.5 mg total) by mouth 2 (two) times daily as needed. Levalbuterol Tartrate 45 MCG/ACT Inhalation Aerosol Inhale 1-2 puffs into the lungs every 4 (four) hours as needed. (Patient not taking: Reported on 8/9/2023)      Multiple Vitamin (MULTI-VITAMIN DAILY) Oral Tab Take by mouth. fluticasone propionate 50 MCG/ACT Nasal Suspension 2 sprays by Nasal route daily. (Patient not taking: Reported on 8/9/2023)      ibuprofen 400 MG Oral Tab Take 1 tablet (400 mg total) by mouth 2 (two) times daily.  (Patient not taking: Reported on 8/9/2023)        Past Medical History:   Diagnosis Date    Allergic rhinitis 2017    Ragweed grass hay dust    Anxiety     COPD (chronic obstructive pulmonary disease) ([facility])     It was an incedental finding i was never told about on a ct    Essential hypertension     During panic attacks    Fibromyalgia     Hyperlipidemia     Always beverley high on test    Migraines       Social History:  Social History     Socioeconomic History    Marital status: Single   Tobacco Use    Smoking status: Every Day     Packs/day: 1.00     Years: 22.00     Additional pack years: 0.00     Total pack years: 22.00     Types: Cigarettes    Smokeless tobacco: Never    Tobacco comments:     I dont like it but cant stop   Vaping Use    Vaping Use: Never used   Substance and Sexual Activity    Alcohol use: Never    Drug use: Never   Other Topics Concern    Caffeine Concern No    Exercise Yes    Seat Belt Yes    Special Diet No    Stress Concern Yes    Weight Concern Yes    Reaction to local anesthetic No    Pt has a pacemaker No    Pt has a defibrillator No        REVIEW OF SYSTEMS:   GENERAL HEALTH:  See HPI  SKIN: denies any unusual skin lesions or rashes  RESPIRATORY: denies shortness of breath with exertion  CARDIOVASCULAR: denies chest pain on exertion  GI: denies abdominal pain and denies heartburn  :  No abnormal vaginal discharge. See HPI  NEURO: denies headaches    EXAM:     Video Visit on Demand    GENERAL: well developed, well nourished,in no apparent distress. Patient is very forthcoming regarding the level of stress she is experiencing at this time. Patient appears sad, but is very pleasant and is able to provide an excellent HPI and ROS. Patie. nt is also forthcoming about how much anxiety she experiences with personal health concerns as they arise. ASSESSMENT AND PLAN:     1. Secondary amenorrhea    Only 10 days to date    - HCG, Beta Subunit (Quant Pregnancy Test) [E]; Future  - If Positive patient will f/u with her OB/Gyne  - If Negative will place orders for FSH, E2, TSH, PRL and patient to schedule with PCP for follow up. - Advised that the lab workup is out of the usual scope of care for the Video Visits on Demand, but patient is extremely concerned, has many health problems with children to attend to and cannot be seen by her providers for a period of time. - Patient states that she has care currently provided by a Psychiatrist and a Therapist to assist with medications and coping. Consent given by patient to conduct the Video Visit. Approx 15-20 minutes spend in direct patient evaluation. Fabiola Carranza

## 2023-11-10 ENCOUNTER — HOSPITAL ENCOUNTER (OUTPATIENT)
Age: 38
Discharge: HOME | End: 2023-11-10
Payer: MEDICAID

## 2023-11-10 VITALS
SYSTOLIC BLOOD PRESSURE: 124 MMHG | RESPIRATION RATE: 18 BRPM | TEMPERATURE: 98 F | DIASTOLIC BLOOD PRESSURE: 80 MMHG | HEART RATE: 85 BPM | OXYGEN SATURATION: 100 %

## 2023-11-10 DIAGNOSIS — M54.50 ACUTE RIGHT-SIDED LOW BACK PAIN WITHOUT SCIATICA: Primary | ICD-10-CM

## 2023-11-10 LAB
B-HCG UR QL: NEGATIVE
BILIRUB UR QL STRIP: NEGATIVE
COLOR UR: YELLOW
GLUCOSE UR STRIP-MCNC: NEGATIVE MG/DL
HGB UR QL STRIP: NEGATIVE
KETONES UR STRIP-MCNC: NEGATIVE MG/DL
LEUKOCYTE ESTERASE UR QL STRIP: NEGATIVE
NITRITE UR QL STRIP: NEGATIVE
PH UR STRIP: 5.5 [PH]
PROT UR STRIP-MCNC: NEGATIVE MG/DL
SP GR UR STRIP: 1.02
UROBILINOGEN UR STRIP-ACNC: <2 MG/DL

## 2023-11-10 PROCEDURE — 99212 OFFICE O/P EST SF 10 MIN: CPT

## 2023-11-10 PROCEDURE — 99214 OFFICE O/P EST MOD 30 MIN: CPT

## 2023-11-10 PROCEDURE — 81025 URINE PREGNANCY TEST: CPT

## 2023-11-10 PROCEDURE — 81002 URINALYSIS NONAUTO W/O SCOPE: CPT

## 2023-11-10 NOTE — DISCHARGE INSTRUCTIONS
Please go to ER if:  1. Loss of stool or urine  2. Groin numbness  3. Fever  4. Leg weakness/falls  5.  Any other concerns

## 2023-11-10 NOTE — ED INITIAL ASSESSMENT (HPI)
Lower back pain for 2 days, states was on the floor doing a project for 5 hours, no urinary symptoms, no numbness or tingling, denies direct trauma or injury

## 2023-12-01 ENCOUNTER — TELEMEDICINE (OUTPATIENT)
Dept: TELEHEALTH | Age: 38
End: 2023-12-01
Payer: MEDICAID

## 2023-12-01 DIAGNOSIS — U07.1 COVID-19: ICD-10-CM

## 2023-12-01 DIAGNOSIS — H92.03 OTALGIA OF BOTH EARS: Primary | ICD-10-CM

## 2023-12-01 DIAGNOSIS — J45.41 MODERATE PERSISTENT ASTHMA WITH EXACERBATION: ICD-10-CM

## 2023-12-01 PROCEDURE — 99213 OFFICE O/P EST LOW 20 MIN: CPT | Performed by: NURSE PRACTITIONER

## 2023-12-01 RX ORDER — FLUTICASONE PROPIONATE 50 MCG
2 SPRAY, SUSPENSION (ML) NASAL DAILY
Qty: 1 EACH | Refills: 1 | OUTPATIENT
Start: 2023-12-01 | End: 2024-01-30

## 2023-12-01 RX ORDER — AZITHROMYCIN 250 MG/1
TABLET, FILM COATED ORAL
Qty: 6 TABLET | Refills: 0 | Status: SHIPPED | OUTPATIENT
Start: 2023-12-01 | End: 2023-12-05

## 2023-12-01 RX ORDER — LEVALBUTEROL TARTRATE 45 UG/1
2 AEROSOL, METERED ORAL EVERY 4 HOURS PRN
Qty: 1 EACH | Refills: 0 | Status: SHIPPED | OUTPATIENT
Start: 2023-12-01

## 2023-12-04 NOTE — PROGRESS NOTES
CHIEF COMPLAINT:   \"COVID\"  HPI:   Ben Ashford is a 40year old female. Patient presents via Video Visit on Demand. Patient consents to conducting the visit virtually and acknowledges limitations without an in person examination. Patient presents with a hx of an abrupt on set of a fever and runny nose on 11/25/23 and tested Positive for COVID the next day. Symptoms have nasal and bilateral ear congestion and pressure.  :Patient is experiencing very uncomfortable bilateral ear pain. Patient's Asthma has exacerbated. She is having frequent coughing jags. She is sleeping propped up to sleep  She ix using her Xopenex inhaler. Pt feels ill and exhausted  No known fever. Current Outpatient Medications   Medication Sig Dispense Refill    azithromycin 250 MG Oral Tab Take 2 tablets (500 mg total) by mouth daily for 1 day, THEN 1 tablet (250 mg total) daily for 4 days. 6 tablet 0    fluticasone propionate 50 MCG/ACT Nasal Suspension 2 sprays by Each Nare route daily. 1 each 1    Levalbuterol Tartrate (XOPENEX HFA) 45 MCG/ACT Inhalation Aerosol Inhale 2 puffs into the lungs every 4 (four) hours as needed for Wheezing (Bronchospasm). 1 each 0    sertraline 25 MG Oral Tab Take 1 tablet (25 mg total) by mouth every morning. spironolactone 25 MG Oral Tab Take 25mg daily x 2 weeks, 50mg daily x 2 weeks, then 75mg daily x 2 weeks, then 100mg once daily 120 tablet 2    doxycycline 100 MG Oral Cap Take 1 capsule (100 mg total) by mouth 2 (two) times daily. Take with meals. Cannot become pregnant or breastfeed on this medicine. 60 capsule 2    nicotine polacrilex 4 MG Mouth/Throat Gum Take 1 each (4 mg total) by mouth as needed for Smoking cessation. Weeks 1 to 6: Chew 1 piece of gum every 1 to 2 hours (maximum: 24 pieces/day); to increase chances of quitting, chew at least 9 pieces/day during the first 6 weeks. Weeks 7 to 9: Chew 1 piece of gum every 2 to 4 hours (maximum: 24 pieces/day). Weeks 10 to 12:  Chew 1 piece of gum every 4 to 8 hours (maximum: 24 pieces/day (Patient not taking: Reported on 2023) 90 each 1    BANOPHEN 25 MG Oral Tab       famotidine 20 MG Oral Tab Take 1 tablet (20 mg total) by mouth 2 (two) times daily. metroNIDAZOLE 0.75 % Vaginal Gel Place 5 g vaginally nightly. (Patient not taking: Reported on 2023)      neomycin-polymyxin-hydrocortisone 3.5-32172-1 Otic Suspension  (Patient not taking: Reported on 2023)      clonazePAM 0.5 MG Oral Tab Take 1 tablet (0.5 mg total) by mouth 2 (two) times daily as needed. Levalbuterol Tartrate 45 MCG/ACT Inhalation Aerosol Inhale 1-2 puffs into the lungs every 4 (four) hours as needed. (Patient not taking: Reported on 2023)      Multiple Vitamin (MULTI-VITAMIN DAILY) Oral Tab Take by mouth. ibuprofen 400 MG Oral Tab Take 1 tablet (400 mg total) by mouth 2 (two) times daily.  (Patient not taking: Reported on 2023)        Past Medical History:   Diagnosis Date    Allergic rhinitis 2017    Ragweed grass hay dust    Anxiety     COPD (chronic obstructive pulmonary disease) (HCC)     It was an incedental finding i was never told about on a ct    Essential hypertension     During panic attacks    Fibromyalgia     Hyperlipidemia     Always beverley high on test    Migraines       Past Surgical History:   Procedure Laterality Date          2 children 04 09 and 10 14    OTHER SURGICAL HISTORY      Ankle      Family History   Problem Relation Age of Onset    Anemia Maternal Grandmother     Pulmonary Disease Maternal Grandmother     Stroke Maternal Grandmother     Obesity Father     Obesity Brother       Social History     Socioeconomic History    Marital status: Single   Tobacco Use    Smoking status: Every Day     Packs/day: 1.00     Years: 22.00     Additional pack years: 0.00     Total pack years: 22.00     Types: Cigarettes    Smokeless tobacco: Never    Tobacco comments:     I dont like it but cant stop   Vaping Use    Vaping Use: Never used   Substance and Sexual Activity    Alcohol use: Never    Drug use: Never   Other Topics Concern    Caffeine Concern No    Exercise Yes    Seat Belt Yes    Special Diet No    Stress Concern Yes    Weight Concern Yes    Reaction to local anesthetic No    Pt has a pacemaker No    Pt has a defibrillator No         REVIEW OF SYSTEMS:   GENERAL:  See HPI  SKIN: no rashes or abnormal skin lesions  HEENT: See HPI  LUNGS: ee HPI  CARDIOVASCULAR: denies chest pain or palpitations   GI: denies N/V/C or abdominal pain  NEURO: + headaches    EXAM:     Video Visit on Demand    GENERAL: well developed, well nourished. Pt is ill-appearing, but non-toxic appearing. She appears uncomfortable due to bilateral ear pain. She is fatigued appearing. Patient is very pleasant and is able to provide an excellent HPI and ROS. She is able to provide a through HPI and ROS without windedness. Patient is interrupted with brief coughing jags which resolve within seconds and is able to resume his conversation. No accessory use. ASSESSMENT AND PLAN:   Sruthi Henley is a 40year old female who presents with     1. Otalgia of both ears    - azithromycin 250 MG Oral Tab; Take 2 tablets (500 mg total) by mouth daily for 1 day, THEN 1 tablet (250 mg total) daily for 4 days. Dispense: 6 tablet; Refill: 0    2. COVID-19    - discussed quarantine and mask guidelines    3. Moderate persistent asthma with exacerbation    - fluticasone propionate 50 MCG/ACT Nasal Suspension; 2 sprays by Each Nare route daily. Dispense: 1 each; Refill: 1  - Levalbuterol Tartrate (XOPENEX HFA) 45 MCG/ACT Inhalation Aerosol; Inhale 2 puffs into the lungs every 4 (four) hours as needed for Wheezing (Bronchospasm). Dispense: 1 each; Refill: 0    Advised patient to follow up with Dr. Felix Crowley if not improving with each day. Advised to go directly to the ED for any worsening of symptoms. Consent given by patient to conduct the Video Visit.   Approx 20 minutes spend in direct patient evaluation.

## 2024-01-08 ENCOUNTER — HOSPITAL ENCOUNTER (OUTPATIENT)
Age: 39
Discharge: HOME | End: 2024-01-08
Attending: EMERGENCY MEDICINE
Payer: MEDICAID

## 2024-01-08 VITALS
HEART RATE: 109 BPM | OXYGEN SATURATION: 99 % | DIASTOLIC BLOOD PRESSURE: 90 MMHG | TEMPERATURE: 99 F | SYSTOLIC BLOOD PRESSURE: 128 MMHG | RESPIRATION RATE: 19 BRPM

## 2024-01-08 DIAGNOSIS — R10.84 ABDOMINAL PAIN, GENERALIZED: Primary | ICD-10-CM

## 2024-01-08 LAB
B-HCG UR QL: NEGATIVE
BILIRUB UR QL STRIP: NEGATIVE
CLARITY UR: CLEAR
COLOR UR: YELLOW
GLUCOSE UR STRIP-MCNC: NEGATIVE MG/DL
HGB UR QL STRIP: NEGATIVE
KETONES UR STRIP-MCNC: NEGATIVE MG/DL
LEUKOCYTE ESTERASE UR QL STRIP: NEGATIVE
NITRITE UR QL STRIP: NEGATIVE
PH UR STRIP: 5.5 [PH]
PROT UR STRIP-MCNC: NEGATIVE MG/DL
SP GR UR STRIP: 1.02
UROBILINOGEN UR STRIP-ACNC: <2 MG/DL

## 2024-01-08 PROCEDURE — 81025 URINE PREGNANCY TEST: CPT

## 2024-01-08 PROCEDURE — 81002 URINALYSIS NONAUTO W/O SCOPE: CPT

## 2024-01-08 PROCEDURE — 99213 OFFICE O/P EST LOW 20 MIN: CPT

## 2024-01-08 PROCEDURE — 99212 OFFICE O/P EST SF 10 MIN: CPT

## 2024-01-08 NOTE — ED PROVIDER NOTES
Patient Seen in: [facility]      History     Chief Complaint   Patient presents with    Abdomen/Flank Pain     Stated Complaint: Flank Pain - Abdominal pain past 9 days. ( All over )     Subjective:   HPI    39 yo female with abdominal pain for the last nine days. This is somewhat of a chronic issue. She c/o pain that started after taking one dose of clindamycin. No vomiting. No diarrhea. No fever. No urinary symptoms. She in general does not feel well.     Objective:   No pertinent past medical history.            No pertinent past surgical history.              No pertinent social history.            Review of Systems    Positive for stated complaint: Flank Pain - Abdominal pain past 9 days. ( All over )   Other systems are as noted in HPI.  Constitutional and vital signs reviewed.      All other systems reviewed and negative except as noted above.    Physical Exam     ED Triage Vitals [01/08/24 0905]   /90   Pulse 109   Resp 19   Temp 98.6 °F (37 °C)   Temp src Temporal   SpO2 99 %   O2 Device None (Room air)       Current:/90   Pulse 109   Temp 98.6 °F (37 °C) (Temporal)   Resp 19   LMP 12/27/2023 (Exact Date)   SpO2 99%         Physical Exam  Vitals and nursing note reviewed.   Constitutional:       Appearance: Normal appearance. She is well-developed.   HENT:      Head: Normocephalic and atraumatic.   Cardiovascular:      Rate and Rhythm: Normal rate and regular rhythm.   Pulmonary:      Effort: Pulmonary effort is normal. No respiratory distress.   Abdominal:      General: Abdomen is flat.      Palpations: Abdomen is soft.      Tenderness: There is no abdominal tenderness.   Skin:     General: Skin is warm and dry.      Capillary Refill: Capillary refill takes less than 2 seconds.   Neurological:      General: No focal deficit present.      Mental Status: She is alert.      Sensory: No sensory deficit.   Psychiatric:         Mood and Affect: Mood normal.         Behavior:  Behavior normal.              ED Course     Labs Reviewed   POCT PREGNANCY URINE - Normal   [facility] POCT URINALYSIS DIPSTICK                      MDM                                      Medical Decision Making  Gallbladder pathology, appendicitis, colitis, urinary tract pathology, digestive disorder all in differential. Patient with chronic symptoms and no focal findings on exam. Long discussion about dietary modifications and otc supplements for symptom control. Follow up with primary care.     Disposition and Plan     Clinical Impression:  1. Abdominal pain, generalized         Disposition:  Discharge  1/8/2024 10:18 am    Follow-up:  Ammon Ortega MD  [address] [place] [id number]  [phone]    In 1 week            Medications Prescribed:  Current Discharge Medication List

## 2024-01-08 NOTE — ED INITIAL ASSESSMENT (HPI)
Patient presents with generalized abdominal discomfort. Patient states that she has gas discomfort and belching. + nausea without emesis. Patient states that sx started after she took one dose of Cindamycin. Patient also with chronic vaginal yeast infection and would like Diflucan.

## 2024-03-01 ENCOUNTER — E-VISIT (OUTPATIENT)
Dept: TELEHEALTH | Age: 39
End: 2024-03-01
Payer: MEDICAID

## 2024-03-01 DIAGNOSIS — B97.89 ACUTE VIRAL SINUSITIS: Primary | ICD-10-CM

## 2024-03-01 DIAGNOSIS — J01.90 ACUTE VIRAL SINUSITIS: Primary | ICD-10-CM

## 2024-03-01 NOTE — PROGRESS NOTES
Carmita Cope is a 38 year old female.  HPI:   See answers to questions above.     Current Outpatient Medications   Medication Sig Dispense Refill    Levalbuterol Tartrate (XOPENEX HFA) 45 MCG/ACT Inhalation Aerosol Inhale 2 puffs into the lungs every 4 (four) hours as needed for Wheezing (Bronchospasm). 1 each 0    sertraline 25 MG Oral Tab Take 1 tablet (25 mg total) by mouth every morning.      spironolactone 25 MG Oral Tab Take 25mg daily x 2 weeks, 50mg daily x 2 weeks, then 75mg daily x 2 weeks, then 100mg once daily 120 tablet 2    doxycycline 100 MG Oral Cap Take 1 capsule (100 mg total) by mouth 2 (two) times daily. Take with meals. Cannot become pregnant or breastfeed on this medicine. 60 capsule 2    nicotine polacrilex 4 MG Mouth/Throat Gum Take 1 each (4 mg total) by mouth as needed for Smoking cessation. Weeks 1 to 6: Chew 1 piece of gum every 1 to 2 hours (maximum: 24 pieces/day); to increase chances of quitting, chew at least 9 pieces/day during the first 6 weeks. Weeks 7 to 9: Chew 1 piece of gum every 2 to 4 hours (maximum: 24 pieces/day). Weeks 10 to 12: Chew 1 piece of gum every 4 to 8 hours (maximum: 24 pieces/day (Patient not taking: Reported on 5/4/2023) 90 each 1    BANOPHEN 25 MG Oral Tab       famotidine 20 MG Oral Tab Take 1 tablet (20 mg total) by mouth 2 (two) times daily.      metroNIDAZOLE 0.75 % Vaginal Gel Place 5 g vaginally nightly. (Patient not taking: Reported on 5/4/2023)      neomycin-polymyxin-hydrocortisone 3.5-33625-8 Otic Suspension  (Patient not taking: Reported on 5/4/2023)      clonazePAM 0.5 MG Oral Tab Take 1 tablet (0.5 mg total) by mouth 2 (two) times daily as needed.      Levalbuterol Tartrate 45 MCG/ACT Inhalation Aerosol Inhale 1-2 puffs into the lungs every 4 (four) hours as needed. (Patient not taking: Reported on 8/9/2023)      Multiple Vitamin (MULTI-VITAMIN DAILY) Oral Tab Take by mouth.      ibuprofen 400 MG Oral Tab Take 1 tablet (400 mg total) by mouth 2  (two) times daily. (Patient not taking: Reported on 2023)        Past Medical History:   Diagnosis Date    Allergic rhinitis 2017    Ragweed grass hay dust    Anxiety     COPD (chronic obstructive pulmonary disease) (HCC)     It was an incedental finding i was never told about on a ct    Essential hypertension     During panic attacks    Fibromyalgia     Hyperlipidemia     Always beverley high on test    Migraines       Past Surgical History:   Procedure Laterality Date          2 children 04 09 and 10 14    OTHER SURGICAL HISTORY  1991    Ankle      Family History   Problem Relation Age of Onset    Anemia Maternal Grandmother     Pulmonary Disease Maternal Grandmother     Stroke Maternal Grandmother     Obesity Father     Obesity Brother       Social History:  Social History     Socioeconomic History    Marital status: Single   Tobacco Use    Smoking status: Every Day     Packs/day: 1.00     Years: 22.00     Additional pack years: 0.00     Total pack years: 22.00     Types: Cigarettes    Smokeless tobacco: Never    Tobacco comments:     I dont like it but cant stop   Vaping Use    Vaping Use: Never used   Substance and Sexual Activity    Alcohol use: Never    Drug use: Never   Other Topics Concern    Caffeine Concern No    Exercise Yes    Seat Belt Yes    Special Diet No    Stress Concern Yes    Weight Concern Yes    Reaction to local anesthetic No    Pt has a pacemaker No    Pt has a defibrillator No         ASSESSMENT AND PLAN:     Encounter Diagnosis   Name Primary?    Acute viral sinusitis Yes     Discussed supportive care measures/otc meds to take and when to seek in person care      Meds & Refills for this Visit:  Requested Prescriptions      No prescriptions requested or ordered in this encounter       Duration of  the service:  10 minutes    Patient advised to follow up with PCP if no improvement or worsening of symptoms  Refer to [employer] message for specific patient instructions

## 2024-03-05 ENCOUNTER — HOSPITAL ENCOUNTER (OUTPATIENT)
Age: 39
Discharge: HOME | End: 2024-03-05
Attending: EMERGENCY MEDICINE
Payer: MEDICAID

## 2024-03-05 VITALS
TEMPERATURE: 98 F | SYSTOLIC BLOOD PRESSURE: 125 MMHG | DIASTOLIC BLOOD PRESSURE: 82 MMHG | HEART RATE: 107 BPM | OXYGEN SATURATION: 99 % | RESPIRATION RATE: 20 BRPM

## 2024-03-05 DIAGNOSIS — J01.00 ACUTE NON-RECURRENT MAXILLARY SINUSITIS: Primary | ICD-10-CM

## 2024-03-05 PROCEDURE — 99213 OFFICE O/P EST LOW 20 MIN: CPT

## 2024-03-05 RX ORDER — AMOXICILLIN AND CLAVULANATE POTASSIUM 875; 125 MG/1; MG/1
1 TABLET, FILM COATED ORAL 2 TIMES DAILY
Qty: 14 TABLET | Refills: 0 | Status: SHIPPED | OUTPATIENT
Start: 2024-03-05 | End: 2024-03-12

## 2024-03-05 NOTE — DISCHARGE INSTRUCTIONS
Thank you for visiting our immediate care for your health care needs.  Please follow up with your regular doctor in the next 1-2 days.  If you have any additional problems please return to the immediate care.  Please take Tylenol and or Motrin for pain and fevers.  Please take all antibiotics as prescribed.

## 2024-03-05 NOTE — ED INITIAL ASSESSMENT (HPI)
Patient with 10 days of sinus congestion, no fevers. Patient states that sinus drainage is now yellow and green. Has bad taste in mouth

## 2024-03-05 NOTE — ED PROVIDER NOTES
Patient Seen in: [facility]      History     Chief Complaint   Patient presents with    Sinusitis     Stated Complaint: Cough - Sinus infection? thick green sputum, in the morning, foul taste, itchy *    Subjective:     38-year-old female presents today for evaluation of congestion sinus pressure mild cough that has been going on for the past 10 days.  No fevers.  No vomiting or diarrhea.  Symptoms are acute mild.    Objective:   No pertinent past medical history.            No pertinent past surgical history.              No pertinent social history.              Physical Exam     ED Triage Vitals [03/05/24 0855]   /82   Pulse 107   Resp 20   Temp 97.7 °F (36.5 °C)   Temp src Temporal   SpO2 99 %   O2 Device None (Room air)       Current:/82   Pulse 107   Temp 97.7 °F (36.5 °C) (Temporal)   Resp 20   LMP 02/21/2024 (Exact Date)   SpO2 99%         Physical Exam  Vitals reviewed.   Constitutional:       General: She is not in acute distress.     Appearance: Normal appearance. She is not ill-appearing or toxic-appearing.   HENT:      Head: Normocephalic and atraumatic.      Right Ear: Tympanic membrane normal.      Left Ear: Tympanic membrane normal.      Nose:      Right Sinus: Maxillary sinus tenderness and frontal sinus tenderness present.      Left Sinus: Maxillary sinus tenderness and frontal sinus tenderness present.      Mouth/Throat:      Mouth: Mucous membranes are moist.      Pharynx: Oropharynx is clear. No pharyngeal swelling, oropharyngeal exudate or posterior oropharyngeal erythema.   Eyes:      Extraocular Movements: Extraocular movements intact.      Conjunctiva/sclera: Conjunctivae normal.   Cardiovascular:      Rate and Rhythm: Normal rate and regular rhythm.   Pulmonary:      Effort: Pulmonary effort is normal.      Breath sounds: Normal breath sounds.   Musculoskeletal:      Cervical back: Neck supple.   Skin:     General: Skin is warm and dry.   Neurological:       Mental Status: She is alert and oriented to person, place, and time.   Psychiatric:         Mood and Affect: Mood normal.         ED Course   Labs Reviewed - No data to display  Imaging:  No results found.              MDM        38 year old female with sinusitis for over 1 week.  Will place on antibiotics and discharged home.    Differential diagnosis (including but not limited to):  Viral sinusitis, bacterial sinusitis, other viral syndrome    ED course:  Pulse Ox: 99% on room air which is normal      Comment: Please note this report has been produced using speech recognition software and may contain errors related to that system including errors in grammar, punctuation, and spelling, as well as words and phrases that may be inappropriate. If there are any questions or concerns please feel free to contact the dictating provider for clarification.                                     Medical Decision Making      Disposition and Plan     Clinical Impression:  1. Acute non-recurrent maxillary sinusitis         Disposition:  Discharge  3/5/2024  9:12 am    Follow-up:  Ammon Ortega MD  [address] [place] [id number]  [phone]    Schedule an appointment as soon as possible for a visit             Medications Prescribed:  Current Discharge Medication List        START taking these medications    Details   amoxicillin clavulanate 875-125 MG Oral Tab Take 1 tablet by mouth 2 (two) times daily for 7 days.  Qty: 14 tablet, Refills: 0                                    Devyn Iglesias MD  3/5/2024  9:12 AM

## 2024-03-07 ENCOUNTER — E-VISIT (OUTPATIENT)
Dept: TELEHEALTH | Age: 39
End: 2024-03-07
Payer: MEDICAID

## 2024-03-07 DIAGNOSIS — T36.95XA ANTIBIOTIC-ASSOCIATED DIARRHEA: Primary | ICD-10-CM

## 2024-03-07 DIAGNOSIS — K52.1 ANTIBIOTIC-ASSOCIATED DIARRHEA: Primary | ICD-10-CM

## 2024-03-07 PROCEDURE — 99422 OL DIG E/M SVC 11-20 MIN: CPT | Performed by: PHYSICIAN ASSISTANT

## 2024-03-07 NOTE — PROGRESS NOTES
Carmita Cope is a 38 year old female who initiated e-visit care today.    HPI:   See answers to questionnaire submission     Current Outpatient Medications   Medication Sig Dispense Refill    amoxicillin clavulanate 875-125 MG Oral Tab Take 1 tablet by mouth 2 (two) times daily for 7 days. 14 tablet 0    Levalbuterol Tartrate (XOPENEX HFA) 45 MCG/ACT Inhalation Aerosol Inhale 2 puffs into the lungs every 4 (four) hours as needed for Wheezing (Bronchospasm). 1 each 0    sertraline 25 MG Oral Tab Take 1 tablet (25 mg total) by mouth every morning.      spironolactone 25 MG Oral Tab Take 25mg daily x 2 weeks, 50mg daily x 2 weeks, then 75mg daily x 2 weeks, then 100mg once daily 120 tablet 2    nicotine polacrilex 4 MG Mouth/Throat Gum Take 1 each (4 mg total) by mouth as needed for Smoking cessation. Weeks 1 to 6: Chew 1 piece of gum every 1 to 2 hours (maximum: 24 pieces/day); to increase chances of quitting, chew at least 9 pieces/day during the first 6 weeks. Weeks 7 to 9: Chew 1 piece of gum every 2 to 4 hours (maximum: 24 pieces/day). Weeks 10 to 12: Chew 1 piece of gum every 4 to 8 hours (maximum: 24 pieces/day (Patient not taking: Reported on 5/4/2023) 90 each 1    BANOPHEN 25 MG Oral Tab       famotidine 20 MG Oral Tab Take 1 tablet (20 mg total) by mouth 2 (two) times daily.      clonazePAM 0.5 MG Oral Tab Take 1 tablet (0.5 mg total) by mouth 2 (two) times daily as needed.      Levalbuterol Tartrate 45 MCG/ACT Inhalation Aerosol Inhale 1-2 puffs into the lungs every 4 (four) hours as needed. (Patient not taking: Reported on 8/9/2023)      Multiple Vitamin (MULTI-VITAMIN DAILY) Oral Tab Take by mouth.      ibuprofen 400 MG Oral Tab Take 1 tablet (400 mg total) by mouth 2 (two) times daily. (Patient not taking: Reported on 8/9/2023)        Past Medical History:   Diagnosis Date    Allergic rhinitis 2017    Ragweed grass hay dust    Anxiety     COPD (chronic obstructive pulmonary disease) (HCC)     It was an  incedental finding i was never told about on a ct    Essential hypertension     During panic attacks    Fibromyalgia     Hyperlipidemia     Always beverley high on test    Migraines       Past Surgical History:   Procedure Laterality Date          2 children 04 09 and 10 14    OTHER SURGICAL HISTORY  1991    Ankle      Family History   Problem Relation Age of Onset    Anemia Maternal Grandmother     Pulmonary Disease Maternal Grandmother     Stroke Maternal Grandmother     Obesity Father     Obesity Brother       Social History:  Social History     Socioeconomic History    Marital status: Single   Tobacco Use    Smoking status: Every Day     Packs/day: 1.00     Years: 22.00     Additional pack years: 0.00     Total pack years: 22.00     Types: Cigarettes    Smokeless tobacco: Never    Tobacco comments:     I dont like it but cant stop   Vaping Use    Vaping Use: Never used   Substance and Sexual Activity    Alcohol use: Never    Drug use: Never   Other Topics Concern    Caffeine Concern No    Exercise Yes    Seat Belt Yes    Special Diet No    Stress Concern Yes    Weight Concern Yes    Reaction to local anesthetic No    Pt has a pacemaker No    Pt has a defibrillator No         ASSESSMENT AND PLAN:       Diagnoses and all orders for this visit:    Antibiotic-associated diarrhea  -     Online E&M 11-20 Min    Probiotic. Fluids. Pepto. Take abx with food.  Follow up for prolonged or worsening sx.        Duration of  the service:  20 minutes      See [employer] message exchange and Patient Instructions for Comfort Care and patient education.

## 2024-04-30 ENCOUNTER — APPOINTMENT (OUTPATIENT)
Dept: GENERAL RADIOLOGY | Age: 39
End: 2024-04-30
Attending: NURSE PRACTITIONER
Payer: MEDICAID

## 2024-04-30 ENCOUNTER — HOSPITAL ENCOUNTER (OUTPATIENT)
Age: 39
Discharge: HOME | End: 2024-04-30
Payer: MEDICAID

## 2024-04-30 VITALS
OXYGEN SATURATION: 98 % | TEMPERATURE: 98 F | DIASTOLIC BLOOD PRESSURE: 67 MMHG | RESPIRATION RATE: 18 BRPM | HEART RATE: 102 BPM | SYSTOLIC BLOOD PRESSURE: 106 MMHG

## 2024-04-30 DIAGNOSIS — Z87.09 HISTORY OF INFLUENZA: Primary | ICD-10-CM

## 2024-04-30 DIAGNOSIS — R05.1 ACUTE COUGH: ICD-10-CM

## 2024-04-30 PROCEDURE — 99213 OFFICE O/P EST LOW 20 MIN: CPT

## 2024-04-30 PROCEDURE — 99214 OFFICE O/P EST MOD 30 MIN: CPT

## 2024-04-30 PROCEDURE — 71046 X-RAY EXAM CHEST 2 VIEWS: CPT | Performed by: NURSE PRACTITIONER

## 2024-04-30 NOTE — ED INITIAL ASSESSMENT (HPI)
Dx with flu on 4/21,here for sinus pressure and congestion, cough improved, no fever, pt is smoker, no sob

## 2024-05-01 NOTE — ED PROVIDER NOTES
Patient Seen in: [facility]      History     Chief Complaint   Patient presents with    Cough     Lingering symptoms of influenza A 9 days later - Entered by patient     Stated Complaint: Cough - Lingering symptoms of influenza A 9 days later    Subjective:   HPI    38-year-old female presents with continued productive cough and nasal congestion after being diagnosed with influenza A 9 days ago.  She does have history of allergic rhinitis anxiety COPD and hypertension.  She has been taking Tylenol Xopenex as needed.  She does have history of anxiety and many medications seem to trigger her symptoms.  She tries to avoid decongestants and albuterol.  No chest pain.  No shortness of breath.  No fever.    Objective:   Past Medical History:    Allergic rhinitis    Ragweed grass hay dust    Anxiety    COPD (chronic obstructive pulmonary disease) (HCC)    It was an incedental finding i was never told about on a ct    Essential hypertension    During panic attacks    Fibromyalgia    Hyperlipidemia    Always beverley high on test    Migraines              Past Surgical History:   Procedure Laterality Date          2 children 04 09 and 10 14    Other surgical history      Ankle                Social History     Socioeconomic History    Marital status: Single   Tobacco Use    Smoking status: Every Day     Current packs/day: 1.00     Average packs/day: 1 pack/day for 22.0 years (22.0 ttl pk-yrs)     Types: Cigarettes    Smokeless tobacco: Never    Tobacco comments:     I dont like it but cant stop   Vaping Use    Vaping status: Never Used   Substance and Sexual Activity    Alcohol use: Never    Drug use: Never   Other Topics Concern    Caffeine Concern No    Exercise Yes    Seat Belt Yes    Special Diet No    Stress Concern Yes    Weight Concern Yes    Reaction to local anesthetic No    Pt has a pacemaker No    Pt has a defibrillator No     Social Determinants of Health      Received from [employer],  [facility]              Review of Systems    Positive for stated complaint: Cough - Lingering symptoms of influenza A 9 days later  Other systems are as noted in HPI.  Constitutional and vital signs reviewed.      All other systems reviewed and negative except as noted above.    Physical Exam     ED Triage Vitals [04/30/24 1759]   /67   Pulse 102   Resp 18   Temp 98.1 °F (36.7 °C)   Temp src Temporal   SpO2 98 %   O2 Device None (Room air)       Current:/67   Pulse 102   Temp 98.1 °F (36.7 °C) (Temporal)   Resp 18   LMP 02/21/2024 (Exact Date)   SpO2 98%         Physical Exam  Vitals and nursing note reviewed.   Constitutional:       Appearance: Normal appearance.   HENT:      Right Ear: Tympanic membrane normal.      Left Ear: Tympanic membrane normal.      Nose: Congestion present.      Mouth/Throat:      Pharynx: No posterior oropharyngeal erythema.   Eyes:      Extraocular Movements: Extraocular movements intact.      Pupils: Pupils are equal, round, and reactive to light.   Cardiovascular:      Rate and Rhythm: Normal rate and regular rhythm.      Pulses: Normal pulses.      Heart sounds: Normal heart sounds.   Pulmonary:      Effort: Pulmonary effort is normal.      Breath sounds: Normal breath sounds.      Comments: Congested cough  Musculoskeletal:      Cervical back: Normal range of motion and neck supple.   Skin:     General: Skin is warm and dry.   Neurological:      Mental Status: She is alert.           ED Course   Labs Reviewed - No data to display                   MDM        38-year-old female presents with the above complaints diagnosed with influenza 9 days ago  Persistent influenza, post viral cough, otitis, pneumonia sinusitis considered  Exam unremarkable afebrile sats 98% on room air  I personally viewed the x-ray there is reactive airway disease inflammation no consolidation or pneumonia will await radiology read  Advised Xopenex usual medications avoid  decongestants consider prednisone although has history of panic and anxiety attacks concern for worsening of these symptoms so will avoid at this time  Close PMD follow-up return for concerns                               Medical Decision Making  Problems Addressed:  Acute cough: acute illness or injury  History of influenza: acute illness or injury    Amount and/or Complexity of Data Reviewed  Radiology: ordered and independent interpretation performed. Decision-making details documented in ED Course.    Risk  OTC drugs.  Prescription drug management.        Disposition and Plan     Clinical Impression:  1. History of influenza    2. Acute cough         Disposition:  Discharge  4/30/2024  6:53 pm    Follow-up:  Ammon Ortega MD  [address] [place] [id number]  [phone]    Schedule an appointment as soon as possible for a visit             Medications Prescribed:  Discharge Medication List as of 4/30/2024  6:54 PM

## 2024-06-03 ENCOUNTER — HOSPITAL ENCOUNTER (OUTPATIENT)
Age: 39
Discharge: HOME | End: 2024-06-03
Payer: MEDICAID

## 2024-06-03 VITALS
HEART RATE: 99 BPM | DIASTOLIC BLOOD PRESSURE: 83 MMHG | OXYGEN SATURATION: 99 % | TEMPERATURE: 98 F | RESPIRATION RATE: 20 BRPM | SYSTOLIC BLOOD PRESSURE: 131 MMHG

## 2024-06-03 DIAGNOSIS — L73.2 HIDRADENITIS SUPPURATIVA OF RIGHT AXILLA: Primary | ICD-10-CM

## 2024-06-03 PROCEDURE — 99213 OFFICE O/P EST LOW 20 MIN: CPT | Performed by: NURSE PRACTITIONER

## 2024-06-03 RX ORDER — CLINDAMYCIN HYDROCHLORIDE 300 MG/1
300 CAPSULE ORAL 3 TIMES DAILY
Qty: 30 CAPSULE | Refills: 0 | Status: SHIPPED | OUTPATIENT
Start: 2024-06-03 | End: 2024-06-13

## 2024-06-03 NOTE — ED INITIAL ASSESSMENT (HPI)
Pt is concerned about a possible infection from her right armpit. Pt has history of Hyrdadenitis. Pt states the area started to get worse 5 days ago.  Pt started clindamycin 4x day 2 days ago.

## 2024-06-03 NOTE — ED PROVIDER NOTES
Patient Seen in: [facility]      History     Chief Complaint   Patient presents with    Cellulitis     Stated Complaint: Sinus Problem - Hydradenitis superativa flare up possibly cellulitis?    Subjective:   HPI    38 yr old female here for evaluation of right axillary swelling, pain and discharge that has gotten worse the last 5 days. She started clindamycin she had at home 100mg three times a day, 2 days ago. She circled the area of swelling and redness with a marker and it hasn't gone outside this area. She denies fever or chills, chest pain, shortness of breath, dizziness. She reports hx of cellulitis of this area as well. Sees dermatology, was on a few different antibiotics but clindamycin is best tolerated. Has seen general surgery but does not want surgical intervention due to skin graft she was told she would need.    Objective:   Past Medical History:    Allergic rhinitis    Ragweed grass hay dust    Anxiety    COPD (chronic obstructive pulmonary disease) (HCC)    It was an incedental finding i was never told about on a ct    Essential hypertension    During panic attacks    Fibromyalgia    Hyperlipidemia    Always beverley high on test    Migraines              Past Surgical History:   Procedure Laterality Date          2 children 04 09 and 10 14    Other surgical history  1991    Ankle                Social History     Socioeconomic History    Marital status: Single   Tobacco Use    Smoking status: Every Day     Current packs/day: 1.00     Average packs/day: 1 pack/day for 22.0 years (22.0 ttl pk-yrs)     Types: Cigarettes    Smokeless tobacco: Never    Tobacco comments:     I dont like it but cant stop   Vaping Use    Vaping status: Never Used   Substance and Sexual Activity    Alcohol use: Never    Drug use: Never   Other Topics Concern    Caffeine Concern No    Exercise Yes    Seat Belt Yes    Special Diet No    Stress Concern Yes    Weight Concern Yes    Reaction to local anesthetic No     Pt has a pacemaker No    Pt has a defibrillator No     Social Determinants of Health      [facility] [employer], [facility]              Review of Systems    Positive for stated complaint: Sinus Problem - Hydradenitis superativa flare up possibly cellulitis?  Other systems are as noted in HPI.  Constitutional and vital signs reviewed.      All other systems reviewed and negative except as noted above.    Physical Exam     ED Triage Vitals [06/03/24 1554]   /83   Pulse 99   Resp 20   Temp 98.3 °F (36.8 °C)   Temp src Oral   SpO2 99 %   O2 Device None (Room air)       Current Vitals:   No data recorded          Physical Exam  Vitals and nursing note reviewed.   Constitutional:       General: She is in acute distress (due to pain to axilla).      Appearance: Normal appearance. She is not ill-appearing, toxic-appearing or diaphoretic.   HENT:      Right Ear: Tympanic membrane, ear canal and external ear normal.      Left Ear: Tympanic membrane, ear canal and external ear normal.      Mouth/Throat:      Mouth: Mucous membranes are moist.   Eyes:      Extraocular Movements: Extraocular movements intact.      Conjunctiva/sclera: Conjunctivae normal.      Pupils: Pupils are equal, round, and reactive to light.   Cardiovascular:      Rate and Rhythm: Normal rate.      Pulses: Normal pulses.   Pulmonary:      Effort: Pulmonary effort is normal. No respiratory distress.   Chest:          Comments: Diffuse swelling, firm nodules to right axilla with tenderness, one area of purulent drainage with gentle pressure, no fluctuant area to drain today  Musculoskeletal:      Cervical back: Full passive range of motion without pain.   Lymphadenopathy:      Upper Body:      Right upper body: Axillary adenopathy present.      Left upper body: Axillary adenopathy present.   Skin:     General: Skin is warm.      Findings: Erythema and lesion present.   Neurological:      Mental Status: She is alert and  oriented to person, place, and time.   Psychiatric:         Mood and Affect: Mood normal.         Behavior: Behavior normal.               ED Course     Labs Reviewed   AEROBIC BACTERIAL CULTURE                      MDM     38 yr old female here for evaluation of swelling and pain to right axilla that has increased in the last 5 days.  Denies fever, decreased ROM to arm.    ON exam, pt in distress due to pain in underarm. Breathing easy. No fever, tachycardia present.  Full ROM to right arm, pt able to lift arm overhead.  Tenderness on palpation to diffuse right axilla. Purulent drainage coming from one area. No fluctuance palpated to any of the nodule/lesions. No surrounding erythema or induration though. Localized.    Differential diagnoses reflecting the complexity of care include but are not limited to acute on chronic infection of hidradenitis suppurativa, cellulitis, abscess    Comorbidities that add complexity to management include: chronic hidradenitis, HTN, fibromyalgia  History obtained by an independent source was from: patient  My independent interpretations of studies include: None performed; Culture sent.  Shared decision making was done by: patient and myself  Discussions of management was done with: patient    Patient is well appearing, non-toxic at this time. Vital signs are stable.   Discussed proper antibiotic treatment. PT would prefer clindamycin vs doxy as she did not tolerate it well last time she tried it.   Discussed despite no answers in past, seeing another derm or gen surg for re-evaluation and 2nd opinion due to severity of the hidradentitis in right axilla presenting today.    PT agrees, Advised on wound care, tylenol/motrin for pain, warm compresses/heat to area to help continue draining to relief pressure/pain.    All questions answered. Return and ER precautions given.    Counseled: Patient, regarding diagnosis, regarding treatment plan, regarding diagnostic results, regarding  prescription, I have discussed with the patient the results of tests, differential diagnosis, and warning signs and symptoms that should prompt immediate return. The patient understands these instructions and agrees to the follow-up plan provided. There is no barriers to learning. Appropriate f/u given. Patient agrees to return for any concerns/ problems/complications.                                       Medical Decision Making      Disposition and Plan     Clinical Impression:  1. Hidradenitis suppurativa of right axilla         Disposition:  Discharge  6/3/2024  4:23 pm    Follow-up:  Saeid Smith MD  [address] [facility]  [address]  [facility] [address] [phone]    Schedule an appointment as soon as possible for a visit       Twyla Stone MD  [address] [id number]  [place] [id number] [phone]      general surgery for another opinion          Medications Prescribed:  Discharge Medication List as of 6/3/2024  4:29 PM        START taking these medications    Details   clindamycin 300 MG Oral Cap Take 1 capsule (300 mg total) by mouth 3 (three) times daily for 10 days., Normal, Disp-30 capsule, R-0

## 2024-06-03 NOTE — DISCHARGE INSTRUCTIONS
Warm compresses/heating pad on top of covered skin. To prevent burning. 15 min on 2 hours off while at home    Ibuprofen or tylenol every 6 hours for pain if needed.    Clindamycin 300mg three times a day for 10 days.    Keep area clean and dry, covered daily. If dressing is soiled or absorbed with fluid, change dressing often.    Follow up with dermatology and general surgery for continued evaluation.

## 2024-06-07 ENCOUNTER — HOSPITAL ENCOUNTER (OUTPATIENT)
Age: 39
Discharge: HOME | End: 2024-06-07
Payer: MEDICAID

## 2024-06-07 VITALS
HEART RATE: 103 BPM | RESPIRATION RATE: 16 BRPM | TEMPERATURE: 98 F | SYSTOLIC BLOOD PRESSURE: 131 MMHG | DIASTOLIC BLOOD PRESSURE: 83 MMHG | OXYGEN SATURATION: 97 %

## 2024-06-07 DIAGNOSIS — L73.2 HIDRADENITIS SUPPURATIVA: Primary | ICD-10-CM

## 2024-06-07 LAB
BILIRUB UR QL STRIP: NEGATIVE
CLARITY UR: CLEAR
COLOR UR: YELLOW
GLUCOSE UR STRIP-MCNC: NEGATIVE MG/DL
HGB UR QL STRIP: NEGATIVE
KETONES UR STRIP-MCNC: NEGATIVE MG/DL
LEUKOCYTE ESTERASE UR QL STRIP: NEGATIVE
NITRITE UR QL STRIP: NEGATIVE
PH UR STRIP: 5.5 [PH]
PROT UR STRIP-MCNC: NEGATIVE MG/DL
SP GR UR STRIP: 1.01
UROBILINOGEN UR STRIP-ACNC: <2 MG/DL

## 2024-06-07 PROCEDURE — 81002 URINALYSIS NONAUTO W/O SCOPE: CPT

## 2024-06-07 PROCEDURE — 87070 CULTURE OTHR SPECIMN AEROBIC: CPT | Performed by: NURSE PRACTITIONER

## 2024-06-07 PROCEDURE — 99214 OFFICE O/P EST MOD 30 MIN: CPT

## 2024-06-07 PROCEDURE — 87205 SMEAR GRAM STAIN: CPT | Performed by: NURSE PRACTITIONER

## 2024-06-07 PROCEDURE — 87077 CULTURE AEROBIC IDENTIFY: CPT | Performed by: NURSE PRACTITIONER

## 2024-06-07 RX ORDER — LEVOFLOXACIN 500 MG/1
500 TABLET, FILM COATED ORAL 2 TIMES DAILY
Qty: 10 TABLET | Refills: 0 | Status: SHIPPED | OUTPATIENT
Start: 2024-06-07 | End: 2024-06-14

## 2024-06-07 NOTE — ED PROVIDER NOTES
Patient Seen in: [facility]      History     Chief Complaint   Patient presents with    Wound Recheck     Stated Complaint: Wound Care - Hydradenitis superativa    Subjective: This is a 38-year-old female, past medical history of anxiety, migraines, allergic rhinitis, hypertension, fibromyalgia, COPD, hyperlipidemia, hidradenitis suppurativa -presents to immediate care for reevaluation.  Patient was previously seen at a different [place] [facility] care last week for flareup of her hidradenitis suppurativa.  At that time, she had symptoms for 5 days and was taking previously prescribed clindamycin 100 mg 3 times a day.  Patient with erythema, swelling, discomfort, drainage, discharge at that time.  There was a wound culture sample taken which grew out staph epidermidis.  Patient completed full course of clindamycin for 10 days.  She does note GI upset and diarrhea without urinary symptoms, however, requesting to be evaluated for possible UTI.  Patient states symptoms have significantly improved.  She is without redness, pain, swelling, fevers, chills.  Does report minimal drainage.  She wants wound to be reevaluated as she was told she has MRSA.  No MRSA growth when looking at previous culture from 4 days ago.  Patient requesting to be reevaluated and to have wound culture performed again.  Patient is well-appearing.  Not ill or toxic.  AOx4.  The history is provided by the patient.           Objective:   Past Medical History:    Allergic rhinitis    Ragweed grass hay dust    Anxiety    COPD (chronic obstructive pulmonary disease) (HCC)    It was an incedental finding i was never told about on a ct    Essential hypertension    During panic attacks    Fibromyalgia    Hyperlipidemia    Always beverley high on test    Migraines              Past Surgical History:   Procedure Laterality Date          2 children  and 10 14    Other surgical history      Ankle                Social History      Socioeconomic History    Marital status: Single   Tobacco Use    Smoking status: Every Day     Current packs/day: 1.00     Average packs/day: 1 pack/day for 22.0 years (22.0 ttl pk-yrs)     Types: Cigarettes    Smokeless tobacco: Never    Tobacco comments:     I dont like it but cant stop   Vaping Use    Vaping status: Never Used   Substance and Sexual Activity    Alcohol use: Never    Drug use: Never   Other Topics Concern    Caffeine Concern No    Exercise Yes    Seat Belt Yes    Special Diet No    Stress Concern Yes    Weight Concern Yes    Reaction to local anesthetic No    Pt has a pacemaker No    Pt has a defibrillator No     Social Determinants of Health      Received from [facility], [facility]              Review of Systems   Constitutional: Negative.  Negative for activity change, appetite change, chills, fatigue and fever.   Respiratory: Negative.     Cardiovascular: Negative.    Musculoskeletal: Negative.    Skin:  Positive for wound.       Positive for stated complaint: Wound Care - Hydradenitis superativa  Other systems are as noted in HPI.  Constitutional and vital signs reviewed.      All other systems reviewed and negative except as noted above.    Physical Exam     ED Triage Vitals [06/07/24 1006]   /83   Pulse 103   Resp 16   Temp 97.7 °F (36.5 °C)   Temp src Temporal   SpO2 97 %   O2 Device None (Room air)       Current Vitals:   Vital Signs  BP: 131/83  Pulse: 103  Resp: 16  Temp: 97.7 °F (36.5 °C)  Temp src: Temporal    Oxygen Therapy  SpO2: 97 %  O2 Device: None (Room air)            Physical Exam  Constitutional:       General: She is not in acute distress.     Appearance: Normal appearance. She is not ill-appearing.   HENT:      Head: Normocephalic.      Mouth/Throat:      Mouth: Mucous membranes are moist.   Cardiovascular:      Rate and Rhythm: Regular rhythm. Tachycardia present.      Pulses: Normal pulses.      Heart sounds: Normal heart sounds.   Pulmonary:       Effort: Pulmonary effort is normal.      Breath sounds: Normal breath sounds.   Musculoskeletal:         General: No swelling or tenderness.      Cervical back: Normal range of motion and neck supple. No tenderness.   Lymphadenopathy:      Cervical: No cervical adenopathy.   Skin:     General: Skin is warm.      Capillary Refill: Capillary refill takes less than 2 seconds.      Findings: Wound present. No abscess or erythema.          Neurological:      General: No focal deficit present.      Mental Status: She is alert and oriented to person, place, and time.               ED Course     Labs Reviewed   [facility] POCT URINALYSIS DIPSTICK   AEROBIC BACTERIAL CULTURE   AEROBIC BACTERIAL CULTURE                      MDM      Patient here for wound reevaluation and wanting to discuss wound culture report.  She did have wound culture obtained 4 days ago, however just received call regarding results today.  She is unable to receive Bactrim which wound culture is sensitive to.  The discussion between provider via telephone and patient today was reviewed and chart.  Did discuss prompt and close follow-up with dermatologist to reevaluate patient's symptoms.  Patient does state her symptoms are improving with no pain, redness, fever, chills.  Continuation of chronic hidradenitis suppurativa with drainage and discharge.    Patient also experiencing adverse reaction to clindamycin, diarrhea.  Requesting to be tested for possible urinary tract infection with frequent loose bowel movements.  Although, she is not symptomatic.  Denies any dysuria, hematuria, frequency, retention, pelvic pain, suprapubic pain, flank pain etc.    Urine obtained, no acute cystitis.    Bilateral axilla evaluated with firm, nontender bumps.  No surrounding erythema.  Positive drainage and discharge.  No odor.    Patient agreeable for wound cultures to be obtained from the bilateral axilla.  Antibiotics prescribed, however, patient will wait for culture  results.  She does state that she plans to follow-up with dermatologist at [facility] as they have a specific clinic/program to treat her skin condition.  She was previously offered skin graft which she declined.    Patient feels comfortable with plan of care.  She will follow-up with her dermatologist or PCP for reevaluation in the next 48 to 72 hours.  She is aware of signs and symptoms that warrant reevaluation as well as signs symptoms that warrant ER visit.                                   Medical Decision Making  Amount and/or Complexity of Data Reviewed  External Data Reviewed: labs and notes.        Disposition and Plan     Clinical Impression:  1. Hidradenitis suppurativa         Disposition:  Discharge  6/7/2024 10:44 am    Follow-up:  Ammon Ortega MD  [address] [place] [id number]  [phone]    In 7 days  If symptoms worsen          Medications Prescribed:  Discharge Medication List as of 6/7/2024 10:47 AM        START taking these medications    Details   levoFLOXacin 500 MG Oral Tab Take 1 tablet (500 mg total) by mouth in the morning and 1 tablet (500 mg total) before bedtime. Do all this for 7 days., Normal, Disp-10 tablet, R-0

## 2024-06-07 NOTE — ED INITIAL ASSESSMENT (HPI)
Here for follow up for + staptia on her culture result, pt currently on clindamycin, bilateral axillary abscess improved per patient, no redness, no pain, no fever

## 2024-06-07 NOTE — DISCHARGE INSTRUCTIONS
Urine is clear.  No bladder infection.  Levofloxacin prescribed based on previous culture report of staff epidermidis.  You may hold off on antibiotics until the new wound culture reports are available in the next 48 hours.  However, if in between that time area gets more red, painful, swollen with more drainage and discharge and fevers, I would initiate levofloxacin.  Continue to take probiotic.  Please follow-up with your primary care doctor for wound reevaluation.

## 2024-06-09 ENCOUNTER — TELEPHONE (OUTPATIENT)
Dept: DERMATOLOGY CLINIC | Facility: CLINIC | Age: 39
End: 2024-06-09

## 2024-06-09 RX ORDER — AMOXICILLIN AND CLAVULANATE POTASSIUM 875; 125 MG/1; MG/1
1 TABLET, FILM COATED ORAL 2 TIMES DAILY
Qty: 14 TABLET | Refills: 0 | Status: SHIPPED | OUTPATIENT
Start: 2024-06-09 | End: 2024-06-10

## 2024-06-09 NOTE — TELEPHONE ENCOUNTER
Patient paged provider at 12:49 p.m. States she went to urgent care and was told she has MRSA infection from her culture. She was told to switch her clindamycin to Augmentin. She was also told that her HS is a disease of the lymph nodes. She wants to know if she truly does have MRSA and should switch antibiotics. I reviewed the culture results which did not show any MRSA infection. For now advised to continue with clindamycin. She should page provider again, should her condition worsens. For now patient believes she is okay and does not have any actively draining lesions. She will await further treatment options from Dr. Marques. She will also be seeking further advice from [facility] as well. She expressed understanding.     Dr. Shelli CRESPO. Patient sent you [facility] message as well. .

## 2024-06-10 ENCOUNTER — OFFICE VISIT (OUTPATIENT)
Dept: DERMATOLOGY CLINIC | Facility: CLINIC | Age: 39
End: 2024-06-10

## 2024-06-10 ENCOUNTER — TELEPHONE (OUTPATIENT)
Dept: DERMATOLOGY CLINIC | Facility: CLINIC | Age: 39
End: 2024-06-10

## 2024-06-10 DIAGNOSIS — L73.2 HIDRADENITIS SUPPURATIVA: Primary | ICD-10-CM

## 2024-06-10 PROCEDURE — 99214 OFFICE O/P EST MOD 30 MIN: CPT | Performed by: STUDENT IN AN ORGANIZED HEALTH CARE EDUCATION/TRAINING PROGRAM

## 2024-06-10 RX ORDER — AMOXICILLIN AND CLAVULANATE POTASSIUM 875; 125 MG/1; MG/1
TABLET, FILM COATED ORAL
Qty: 60 TABLET | Refills: 0 | Status: SHIPPED | OUTPATIENT
Start: 2024-06-10

## 2024-06-10 RX ORDER — CLINDAMYCIN PHOSPHATE 10 MG/G
GEL TOPICAL
COMMUNITY
Start: 2024-05-09

## 2024-06-10 NOTE — PROGRESS NOTES
Loly 10, 2024    Established patient     CHIEF COMPLAINT: Hidradenitis suppurativa    HISTORY OF PRESENT ILLNESS: .    1. Hidradenitis suppurativa  Location: bilateral axillary   Duration: over 1 year--recent flare starting 1 week ago  Signs and symptoms: large, painful, draining lesions  Current treatment: clindamycin 300 MG Oral Cap and clindamycin gel-Culture  Past treatments:  clindamycin 300 MG Oral Cap and clindamycin gel        DERM HISTORY:  History of skin cancer: No  History of chronic skin disease/condition: Yes    FAMILY HISTORY:  History of melanoma: No  History of chronic skin disease/condition: No    History/Other:    REVIEW OF SYSTEMS:  Constitutional: Denies fever, chills, unintentional weight loss.   Skin as per HPI    PAST MEDICAL HISTORY:  Past Medical History:    Allergic rhinitis    Ragweed grass hay dust    Anxiety    COPD (chronic obstructive pulmonary disease) (HCC)    It was an incedental finding i was never told about on a ct    Essential hypertension    During panic attacks    Fibromyalgia    Hyperlipidemia    Always beverley high on test    Migraines       Medications  Current Outpatient Medications   Medication Sig Dispense Refill    Clindamycin Phosphate 1 % External Gel APPLY A THIN FILM TO THE AFFECTED AREA DAILY      clindamycin 300 MG Oral Cap Take 1 capsule (300 mg total) by mouth 3 (three) times daily for 10 days. 30 capsule 0    Levalbuterol Tartrate (XOPENEX HFA) 45 MCG/ACT Inhalation Aerosol Inhale 2 puffs into the lungs every 4 (four) hours as needed for Wheezing (Bronchospasm). 1 each 0    sertraline 25 MG Oral Tab Take 1 tablet (25 mg total) by mouth every morning.      spironolactone 25 MG Oral Tab Take 25mg daily x 2 weeks, 50mg daily x 2 weeks, then 75mg daily x 2 weeks, then 100mg once daily 120 tablet 2    nicotine polacrilex 4 MG Mouth/Throat Gum Take 1 each (4 mg total) by mouth as needed for Smoking cessation. Weeks 1 to 6: Chew 1 piece of gum every 1 to 2 hours  (maximum: 24 pieces/day); to increase chances of quitting, chew at least 9 pieces/day during the first 6 weeks. Weeks 7 to 9: Chew 1 piece of gum every 2 to 4 hours (maximum: 24 pieces/day). Weeks 10 to 12: Chew 1 piece of gum every 4 to 8 hours (maximum: 24 pieces/day 90 each 1    BANOPHEN 25 MG Oral Tab       famotidine 20 MG Oral Tab Take 1 tablet (20 mg total) by mouth 2 (two) times daily.      clonazePAM 0.5 MG Oral Tab Take 1 tablet (0.5 mg total) by mouth 2 (two) times daily as needed.      Levalbuterol Tartrate 45 MCG/ACT Inhalation Aerosol Inhale 1-2 puffs into the lungs every 4 (four) hours as needed.      Multiple Vitamin (MULTI-VITAMIN DAILY) Oral Tab Take by mouth.      ibuprofen 400 MG Oral Tab Take 1 tablet (400 mg total) by mouth 2 (two) times daily.      amoxicillin clavulanate 875-125 MG Oral Tab Take 1 tablet by mouth 2 (two) times daily for 7 days. (Patient not taking: Reported on 6/10/2024) 14 tablet 0    levoFLOXacin 500 MG Oral Tab Take 1 tablet (500 mg total) by mouth in the morning and 1 tablet (500 mg total) before bedtime. Do all this for 7 days. (Patient not taking: Reported on 6/10/2024) 10 tablet 0       Objective:    PHYSICAL EXAM:  General: awake, alert, no acute distress  Skin: Skin exam was performed today including the following: axillae. Pertinent findings include:   - with draining nosules and sinus tracts    ASSESSMENT & PLAN:  Pathophysiology of diagnoses discussed with patient.  Therapeutic options reviewed. Risks, benefits, and alternatives discussed with patient. Instructions reviewed at length.    #Hidradenitis suppurativa s/p ILK in past   - History of clindamycin use  - Hesitant to take spironolactone which was prescribed at last vist 1 year prior. . Both humira and cosentyx discussed with patient, but she is not interested in biologics at this time.   - Most recent culture showing actinomyces. Will switch to augmentin and recommended she schedule soonest available  appointment with her ID doctor, Dr. Cherry at [facility] to discuss if she needs extended course. Repeat culture taken today.      Return to clinic: 1 month or sooner if something concerning arises     Igor Marques MD

## 2024-06-10 NOTE — TELEPHONE ENCOUNTER
Patient called    Asking for referral for infectious disease and records to be faxed. Also to have  call infectious disease so patient can be seen before August. Please call

## 2024-07-25 ENCOUNTER — PATIENT MESSAGE (OUTPATIENT)
Dept: DERMATOLOGY CLINIC | Facility: CLINIC | Age: 39
End: 2024-07-25

## 2024-07-26 NOTE — TELEPHONE ENCOUNTER
Discussed with DM, ok to send doxycycline refill if needed, will double check with pt if she has this at home or not.

## 2024-07-26 NOTE — TELEPHONE ENCOUNTER
Refill Request for medication(s):     Last Office Visit: 06/10/2024    Last Refill: 2023    Pt's jim msg:    \"I was wondering if my doxycycline 300 mg could be refilled I have a flare under my left armpit now. Obviously I'm a little hesitant with the testing I have an appointment at [facility] at their dermatology clinic on August 12th so I'd like to just get through until then. I have doxycycline 100 mg that say two times a day but I don't think that's the right amount\"

## 2024-07-29 RX ORDER — DOXYCYCLINE HYCLATE 100 MG
100 TABLET ORAL 2 TIMES DAILY
Qty: 30 TABLET | Refills: 1 | Status: SHIPPED | OUTPATIENT
Start: 2024-07-29

## 2024-07-30 RX ORDER — DOXYCYCLINE 100 MG/1
CAPSULE ORAL
Qty: 60 CAPSULE | Refills: 2 | OUTPATIENT
Start: 2024-07-30

## 2024-08-02 ENCOUNTER — HOSPITAL ENCOUNTER (EMERGENCY)
Facility: HOSPITAL | Age: 39
Discharge: HOME | End: 2024-08-02
Attending: EMERGENCY MEDICINE
Payer: MEDICAID

## 2024-08-02 ENCOUNTER — APPOINTMENT (OUTPATIENT)
Dept: CT IMAGING | Age: 39
End: 2024-08-02
Attending: STUDENT IN AN ORGANIZED HEALTH CARE EDUCATION/TRAINING PROGRAM
Payer: MEDICAID

## 2024-08-02 ENCOUNTER — APPOINTMENT (OUTPATIENT)
Dept: MRI IMAGING | Facility: HOSPITAL | Age: 39
End: 2024-08-02
Attending: EMERGENCY MEDICINE
Payer: MEDICAID

## 2024-08-02 ENCOUNTER — HOSPITAL ENCOUNTER (OUTPATIENT)
Age: 39
Discharge: HOME | End: 2024-08-02
Attending: STUDENT IN AN ORGANIZED HEALTH CARE EDUCATION/TRAINING PROGRAM
Payer: MEDICAID

## 2024-08-02 VITALS
TEMPERATURE: 98 F | RESPIRATION RATE: 14 BRPM | SYSTOLIC BLOOD PRESSURE: 129 MMHG | WEIGHT: 150 LBS | OXYGEN SATURATION: 94 % | HEART RATE: 80 BPM | DIASTOLIC BLOOD PRESSURE: 85 MMHG | BODY MASS INDEX: 28 KG/M2

## 2024-08-02 VITALS
RESPIRATION RATE: 18 BRPM | DIASTOLIC BLOOD PRESSURE: 86 MMHG | OXYGEN SATURATION: 99 % | HEART RATE: 82 BPM | TEMPERATURE: 98 F | SYSTOLIC BLOOD PRESSURE: 150 MMHG

## 2024-08-02 DIAGNOSIS — R51.9 NONINTRACTABLE HEADACHE, UNSPECIFIED CHRONICITY PATTERN, UNSPECIFIED HEADACHE TYPE: Primary | ICD-10-CM

## 2024-08-02 DIAGNOSIS — J32.2 ETHMOID SINUSITIS, UNSPECIFIED CHRONICITY: ICD-10-CM

## 2024-08-02 DIAGNOSIS — J00 ACUTE RHINITIS: ICD-10-CM

## 2024-08-02 DIAGNOSIS — G43.809 OTHER MIGRAINE WITHOUT STATUS MIGRAINOSUS, NOT INTRACTABLE: Primary | ICD-10-CM

## 2024-08-02 LAB
B-HCG UR QL: NEGATIVE
B-HCG UR QL: NEGATIVE
BASOPHILS # BLD AUTO: 0.06 X10(3) UL (ref 0–0.2)
BASOPHILS NFR BLD AUTO: 0.7 %
BILIRUB UR QL STRIP: NEGATIVE
CLARITY UR: CLEAR
COLOR UR: YELLOW
DEPRECATED RDW RBC AUTO: 40.9 FL (ref 35.1–46.3)
EOSINOPHIL # BLD AUTO: 0.13 X10(3) UL (ref 0–0.7)
EOSINOPHIL NFR BLD AUTO: 1.5 %
ERYTHROCYTE [DISTWIDTH] IN BLOOD BY AUTOMATED COUNT: 13.1 % (ref 11–15)
GLUCOSE UR STRIP-MCNC: NEGATIVE MG/DL
HCT VFR BLD AUTO: 45.3 %
HGB BLD-MCNC: 15.6 G/DL
HGB UR QL STRIP: NEGATIVE
IMM GRANULOCYTES # BLD AUTO: 0.01 X10(3) UL (ref 0–1)
IMM GRANULOCYTES NFR BLD: 0.1 %
KETONES UR STRIP-MCNC: NEGATIVE MG/DL
LEUKOCYTE ESTERASE UR QL STRIP: NEGATIVE
LYMPHOCYTES # BLD AUTO: 3.03 X10(3) UL (ref 1–4)
LYMPHOCYTES NFR BLD AUTO: 34.9 %
MCH RBC QN AUTO: 29.5 PG (ref 26–34)
MCHC RBC AUTO-ENTMCNC: 34.4 G/DL (ref 31–37)
MCV RBC AUTO: 85.8 FL
MONOCYTES # BLD AUTO: 0.64 X10(3) UL (ref 0.1–1)
MONOCYTES NFR BLD AUTO: 7.4 %
NEUTROPHILS # BLD AUTO: 4.81 X10 (3) UL (ref 1.5–7.7)
NEUTROPHILS # BLD AUTO: 4.81 X10(3) UL (ref 1.5–7.7)
NEUTROPHILS NFR BLD AUTO: 55.4 %
NITRITE UR QL STRIP: NEGATIVE
PH UR STRIP: 7 [PH]
PLATELET # BLD AUTO: 251 10(3)UL (ref 150–450)
PROT UR STRIP-MCNC: NEGATIVE MG/DL
RBC # BLD AUTO: 5.28 X10(6)UL
SP GR UR STRIP: 1.01
UROBILINOGEN UR STRIP-ACNC: <2 MG/DL
WBC # BLD AUTO: 8.7 X10(3) UL (ref 4–11)

## 2024-08-02 PROCEDURE — 70551 MRI BRAIN STEM W/O DYE: CPT | Performed by: EMERGENCY MEDICINE

## 2024-08-02 PROCEDURE — 36415 COLL VENOUS BLD VENIPUNCTURE: CPT

## 2024-08-02 PROCEDURE — 99284 EMERGENCY DEPT VISIT MOD MDM: CPT

## 2024-08-02 PROCEDURE — 99285 EMERGENCY DEPT VISIT HI MDM: CPT

## 2024-08-02 PROCEDURE — 81025 URINE PREGNANCY TEST: CPT

## 2024-08-02 PROCEDURE — 70450 CT HEAD/BRAIN W/O DYE: CPT | Performed by: STUDENT IN AN ORGANIZED HEALTH CARE EDUCATION/TRAINING PROGRAM

## 2024-08-02 PROCEDURE — 99215 OFFICE O/P EST HI 40 MIN: CPT

## 2024-08-02 PROCEDURE — 85025 COMPLETE CBC W/AUTO DIFF WBC: CPT | Performed by: EMERGENCY MEDICINE

## 2024-08-02 PROCEDURE — 81002 URINALYSIS NONAUTO W/O SCOPE: CPT

## 2024-08-02 RX ORDER — KETOROLAC TROMETHAMINE 10 MG/1
10 TABLET, FILM COATED ORAL EVERY 6 HOURS PRN
Qty: 15 TABLET | Refills: 0 | Status: SHIPPED | OUTPATIENT
Start: 2024-08-02 | End: 2024-08-09

## 2024-08-02 NOTE — ED INITIAL ASSESSMENT (HPI)
Presents with \"weird auras\". Describes 3 episodes over the last 7 days. Starts with \"kaleidoscope vision\" from right eye followed by tingling to RUE and 3 fingers. Denies headache or dizziness. Hx of migraines but \"not like this\". Seen at [facility] yesterday and told she has fluid behind her ears.

## 2024-08-02 NOTE — DISCHARGE INSTRUCTIONS
I discussed your presentation and story with the on-call neurologist who agreed with a CT scan of the head, as well as discussion to begin discontinuation or attempt to quit smoking given risk factors for vascular disease and stroke, and initiation of aspirin.  She recommended follow-up in the neurology clinic on Monday.  Please call the phone number in the discharge summary to schedule a follow-up.    Your CT head was unremarkable other than mild sinusitis.  However, if symptoms change, progress, or you develop any new signs or symptoms, you should present immediately to the emergency department.    As we discussed, you also have inflammation of the sinuses, but this can be due to inflammation and at this time does not appear to be bacterial although you stated you do have doxycycline and can take 100mg once in the morning and once at night for 7 days for possible bacterial infection.  You can also apply over-the-counter intranasal Flonase help decrease nasal mucosal swelling and congestion.  I also given you ENT information for follow-up.

## 2024-08-02 NOTE — ED PROVIDER NOTES
Patient Seen in: [facility] [place]      History     Chief Complaint   Patient presents with    Headache     Weird auras - Entered by patient     Stated Complaint: Headache - Weird auras    Subjective:   HPI    38-year-old female with past medical history of HLD, HTN, and migraines with aura who presents with concern for aura without headache.  The patient states she has a history of migraines with aura.  She states her usual presentation is kaleidoscope vision from the right eye, left eye pain, numbness of the 5 fingers of the right hand.  She states she has been previously assessed by a neurologist through [facility] but is in the process of changing neurologists and does not have follow-up until February as she is requesting ongoing Botox therapy and her previous provider will longer provide this therapy which has helped her in the past.  She states that she had not had headaches for about 2 years but over the last week she has had 3 exacerbations.  The difference from prior headache pattern is she does not develop a headache after the symptoms and it only affects 3 fingers of the right hand.  Other than she started magnesium 3 days ago for headaches, no new medications, she reports hitting the front of her head 1 week ago with no loss of consciousness and no dizziness.  Otherwise no other head trauma or motor vehicle collisions or changes in diet.  Most recent headache was from 9:25 AM to 10:40 AM today which is a couple hours prior to arrival.  She does note photophobia with the headaches.    Objective:   Past Medical History:    Allergic rhinitis    Ragweed grass hay dust    Anxiety    COPD (chronic obstructive pulmonary disease) (HCC)    It was an incedental finding i was never told about on a ct    Essential hypertension    During panic attacks    Fibromyalgia    Hidradenitis suppurativa    Hyperlipidemia    Always beverley high on test    Migraines              Past Surgical History:   Procedure  Laterality Date          2 children 04 09 and 10 14    Other surgical history      Ankle                Social History     Socioeconomic History    Marital status: Single   Tobacco Use    Smoking status: Every Day     Current packs/day: 1.00     Average packs/day: 1 pack/day for 22.0 years (22.0 ttl pk-yrs)     Types: Cigarettes    Smokeless tobacco: Never    Tobacco comments:     I dont like it but cant stop   Vaping Use    Vaping status: Never Used   Substance and Sexual Activity    Alcohol use: Never    Drug use: Never   Other Topics Concern    Caffeine Concern No    Exercise Yes    Seat Belt Yes    Special Diet No    Stress Concern Yes    Weight Concern Yes    Grew up on a farm No    History of tanning Yes    Outdoor occupation No    Breast feeding No    Reaction to local anesthetic No    Pt has a pacemaker No    Pt has a defibrillator No     Social Determinants of Health      [facility]              Review of Systems    Positive for stated Chief Complaint: Headache (Weird auras - Entered by patient)    Other systems are as noted in HPI.  Constitutional and vital signs reviewed.      All other systems reviewed and negative except as noted above.    Physical Exam     ED Triage Vitals [24 1128]   /86   Pulse 82   Resp 18   Temp 98 °F (36.7 °C)   Temp src Temporal   SpO2 99 %   O2 Device None (Room air)       Current Vitals:   Vital Signs  BP: 150/86  Pulse: 82  Resp: 18  Temp: 98 °F (36.7 °C)  Temp src: Temporal    Oxygen Therapy  SpO2: 99 %  O2 Device: None (Room air)            Physical Exam    General: Awake, alert, comfortable on room air, in no distress, tolerating oral secretions, interactive  Pulmonary: Lungs CTA B, no wheezing, no conversational dyspnea  Neuro: CN 2-12 intact, No drift in any extremity, normal B/L finger-to-nose, normal B/L heel drag, negative Romberg, normal gait, symmetrical facial expressions on gross  observation  Musculoskeletal: 5/5 B/L UE and LE strength  HEENT: PERRL, mild direct and consensual photophobia, EOMI, no pain with extraocular muscle movement, no periorbital edema or erythema, no B/L scleral injection, nonerythematous and nonedematous intact B/L TMs, no erythema or edema of the B/L ear canals, very erythematous and edematous nearly occlusive B/L nasal turbinates, normal speech, no submandibular edema, TTP over the ethmoid sinus with no overlying erythema or edema  Psych: Normal mood, normal affect    ED Course     Collected 8/2/2024 12:43       Status: Final result    0 Result Notes      Component  Ref Range & Units 8/2/24 1243   POCT Urine Pregnancy  Negative Negative   Resulting Agency [place] ([facility])              Specimen Collected: 08/02/24 12:43 Last Resulted: 08/02/24 12:44        Collected 8/2/2024 12:37       Status: Final result    0 Result Notes      Component  Ref Range & Units 8/2/24 1237   Urine Color  Yellow Yellow   Urine Clarity  Clear Clear   Specific Gravity, Urine  1.005 - 1.030 1.015   PH, Urine  5.0 - 8.0 7.0   Protein urine  Negative mg/dL Negative   Glucose, Urine  Negative mg/dL Negative   Ketone, Urine  Negative mg/dL Negative   Bilirubin, Urine  Negative Negative   Blood, Urine  Negative Negative   Nitrite Urine  Negative Negative   Urobilinogen urine  <2.0 mg/dL <2.0   Leukocyte esterase urine  Negative Negative   Resulting Agency [place] ([facility])              Specimen Collected: 08/02/24 12:37 Last Resulted: 08/02/24 12:42          Copy of radiology report of patient's CT head:  PROCEDURE: CT BRAIN OR HEAD (CPT=70450)     COMPARISON: [facility], CT BRAIN HEAD WO CONTRAST, 1/05/2013, 10:16 PM.  [facility], CT BRAIN HEAD WO CONTRAST, 3/09/2012, 2:50 PM.     INDICATIONS: Headache with aura.     TECHNIQUE: CT images were obtained without contrast material.  Automated exposure control for dose reduction was used.  Dose information is transmitted to  the ACR (American College of Radiology) NRDR (National Radiology Data Registry) which includes the  Dose Index Registry.     FINDINGS:  CSF SPACES: Ventricles, cisterns, and sulci are appropriate for age.  No hydrocephalus, subarachnoid hemorrhage, or mass.  No midline shift.    CEREBRUM: No edema, hemorrhage, mass, acute infarction, or significant atrophy.    WHITE MATTER: Normal white matter.    CEREBELLUM: No edema, hemorrhage, mass, acute infarction, or significant atrophy.    BRAINSTEM: No edema, hemorrhage, mass, acute infarction, or significant atrophy.    CALVARIUM: No apparent fracture, mass, or other significant visible lesion.    SINUSES: Mild mucosal thickening has developed in the ethmoid sinuses bilaterally.  ORBITS: Limited views are unremarkable.       OTHER: Negative.                Impression  CONCLUSION:  1. No acute intracranial process.  2. Mild ethmoid sinusitis.           Dictated by (CST): Viral Troncoso MD on 8/02/2024 at 1:22 PM      Finalized by (CST): Viral Troncoso MD on 8/02/2024 at 1:23 PM                 Exam Ended: 08/02/24 12:55 Last Resulted: 08/02/24 13:24          MDM   Patient is awake, alert, comfortable on room air, in no distress, afebrile, she has a nonfocal neuroexam with normal strength and coordination, there are signs of rhinitis and TTP over the ethmoid sinus which could suggest possible contribution to the etiology of her auras, her symptoms are overall similar to prior headaches/aura symptoms other than she reports she has not had headaches for 2 years and has had 3 this week and is not having headache following aura as well as only 3 fingers of the right hand not all 5 are involved as is usual for her symptoms, currently symptoms have resolved other than some photophobia, most recent symptoms resolved a couple of hours prior to arrival  -Urine pregnancy and urine dip negative    I spoke with the patient about the limitations of immediate care in assessing for  stroke as we do not have the ability to perform a CTA especially with the patient's history of iodine allergy where we cannot provide preparation to prevent allergic reaction as well as cannot perform MRI and discussed limitations of CT head in ruling out stroke or mass or other possible contributing factors, she states the Emergency Department has not done MRIs in the past in the emergency department as she did not meet criteria, and she agrees with discussion with the on-call neurologist regarding her presentation and symptoms.  I called and spoke with the on-call neurologist, Dr. Trevino  -Per Dr. Trevino symptoms could be changing from migraine with aura to aura without migraine which is not uncommon, given symptoms are overall similar of prior headache symptoms/aura symptoms and patient has a nonfocal neuroexam, Dr. Trevino does not feel the patient requires emergency department assessment at this time, but does feel CTH w/O would be beneficial and if there is no ICH recommend starting aspirin 81 mg daily, and to discuss risk factor of smoking as this can contribute to vascular disease, she also states patient can be seen in neurology clinic on Monday with Dr. Stock which is in 3 days as today is Friday  -Per Dr. Trevino if the headache returns, or if the patient has any new, changing, or progressing signs or symptoms, she is to present immediately to the emergency department for further assessment as at that time she may benefit from further evaluation  -This was all discussed with the patient.  Patient is agreeable to the plan.  She states she is in the process of trying to quit smoking and is aware that this is a risk factor for vascular disease and strokes.  -Patient's CTH radiology report shows no ICH but does show mild ethmoid sinusitis which could be contributing to the patient's symptoms and is consistent on exam as above, this was all discussed with the patient, and I therefore recommended over-the-counter  intranasal Flonase to help treat rhinitis.  We did discuss that this could be inflammatory due to environmental exposures but cannot rule out bacterial infection.  The patient states she has doxycycline 100 mg at home.  We discussed that she could take this once in the morning and once at night for 7 days to treat for any possible bacterial sinusitis.  -The patient has previously tolerated aspirin and denies allergies, she is agreeable to 81 mg daily as recommended by the neurologist.  This was prescribed for the next 4 days until the patient can get in to see the neurologist.  Patient encouraged to make an appointment today for follow-up on Monday.    The patient is agreeable with this plan, she will present immediately to the emergency department any new, changing, or progressing signs or symptoms.  Neurology information was provided in her discharge instructions which were printed for the patient.    Patient was also given ENT information regarding the ethmoid sinusitis and rhinitis    Medical Decision Making  Amount and/or Complexity of Data Reviewed  Labs: ordered.  Radiology: ordered.  Discussion of management or test interpretation with external provider(s): Dr. Kaplan on-call neurologist    Risk  OTC drugs.  Prescription drug management.        Disposition and Plan     Clinical Impression:  1. Nonintractable headache, unspecified chronicity pattern, unspecified headache type    2. Acute rhinitis    3. Ethmoid sinusitis, unspecified chronicity         Disposition:  Discharge  8/2/2024  1:30 pm    Follow-up:  Mirella Stock MD  [address] [place] [id number] [phone]    In 3 days  Call to schedule an appointment for Monday with Neurology    Yonas Sutton MD  [address] [id number] [phone]      ENT          Medications Prescribed:  Discharge Medication List as of 8/2/2024  1:38 PM            Aspirin 81 MG Oral Cap 4 capsule 0 8/2/2024 8/6/2024   Sig:   Take 1 capsule by mouth  daily for 4 days.     Route:   Oral               Addendum 1600 PM:    The patient called the immediate care, and asked to speak to me.  The nurse transferred the patient to me. The pt states that her symptoms progressed/returned and she did go to the emergency department.  She was not sure if the neurologist needed to be aware.  I therefore did message the neurologist, Dr. Trevino, to inform her that the patient is in the emergency department waiting room, awaiting evaluation for progression/return of symptoms.    Katty Crowe,

## 2024-08-03 NOTE — ED PROVIDER NOTES
Patient Seen in: [facility] Emergency Department    History     Chief Complaint   Patient presents with    Headache    Numbness     Stated Complaint: numbness, visual changes    HPI  Pt c/o a aura with numbness in r side of face ad some tingling in r hand no headache currently had some pain in a.m..  .  This is similar to previous headaches . Headache is associated with nothing else.  no fever, no stiff neck,     Past Medical History:    Allergic rhinitis    Ragweed grass hay dust    Anxiety    COPD (chronic obstructive pulmonary disease) (HCC)    It was an incedental finding i was never told about on a ct    Essential hypertension    During panic attacks    Fibromyalgia    Hidradenitis suppurativa    Hyperlipidemia    Always beverley high on test    Migraines       Past Surgical History:   Procedure Laterality Date          2 children  09 and 11 14    Other surgical history      Ankle            Family History   Problem Relation Age of Onset    Anemia Maternal Grandmother     Pulmonary Disease Maternal Grandmother     Stroke Maternal Grandmother     Obesity Father     Obesity Brother        Social History     Socioeconomic History    Marital status: Single   Tobacco Use    Smoking status: Every Day     Current packs/day: 1.00     Average packs/day: 1 pack/day for 22.0 years (22.0 ttl pk-yrs)     Types: Cigarettes    Smokeless tobacco: Never    Tobacco comments:     I dont like it but cant stop   Vaping Use    Vaping status: Never Used   Substance and Sexual Activity    Alcohol use: Never    Drug use: Never   Other Topics Concern    Caffeine Concern No    Exercise Yes    Seat Belt Yes    Special Diet No    Stress Concern Yes    Weight Concern Yes    Grew up on a farm No    History of tanning Yes    Outdoor occupation No    Breast feeding No    Reaction to local anesthetic No    Pt has a pacemaker No    Pt has a defibrillator No     Social Determinants of Health      Received from [facility],  [facility]       Review of Systems    Positive for stated complaint: numbness, visual changes  Other systems are as noted in HPI.  Constitutional and vital signs reviewed.      All other systems reviewed and negative except as noted above.    PSFH elements reviewed from today and agreed except as otherwise stated in HPI.    Physical Exam     ED Triage Vitals   BP 08/02/24 1556 141/87   Pulse 08/02/24 1556 111   Resp 08/02/24 1556 18   Temp 08/02/24 1556 98 °F (36.7 °C)   Temp src --    SpO2 08/02/24 1556 98 %   O2 Device 08/02/24 1658 None (Room air)       Current:/77   Pulse 72   Temp 98 °F (36.7 °C)   Resp 14   Wt 68 kg   LMP 02/21/2024 (Exact Date)   SpO2 96%   BMI 28.34 kg/m²   PULSE OX nl  Constitutional:  No acute distress  HEENT:  Head normocephalic and atraumatic. No scleral icterus or erythema. Pharynx moist without erythema or exudate.  CV:  Regular rate and rhythm. No murmur. Peripheral pulses intact.  Respiratory:  Lungs clear to auscultation bilaterally  Abdomen:  Soft, non-tender, non-distended.  Back:  No CVA or vertebral tenderness  Skin:  Normal color. Warm and Dry  Extremities:  Non-tender. No pedal edema.   Neuro:  Oriented x3.  PERRL, EOMI. CN II - XII grossly intact.  No gross motor deficits.  5/5 strength in all distribution.  Sensation fully intact.      DDX to include tension headache vs. Migraine headache vs. Sinusitis vs. SAH        ED Course     Labs Reviewed   POCT PREGNANCY URINE - Normal   CBC WITH DIFFERENTIAL WITH PLATELET    Narrative:     The following orders were created for panel order CBC With Differential With Platelet.  Procedure                               Abnormality         Status                     ---------                               -----------         ------                     CBC W/ DIFFERENTIAL[[id number]]                              Final result                 Please view results for these tests on the individual orders.   CBC W/  DIFFERENTIAL       MDM     Medical Decision Making  Problems Addressed:  Other migraine without status migrainosus, not intractable: acute illness or injury    Amount and/or Complexity of Data Reviewed  External Data Reviewed: notes.     Details: Reviewed ic encounter  Labs: ordered. Decision-making details documented in ED Course.  Discussion of management or test interpretation with external provider(s): Tylenol, motrin recommended.      Risk  OTC drugs.  Prescription drug management.          Re-Exam: reviewed results will fu with primary regarding bonemarrow changes    Patient presenting with headache.  Patient  with no abnormal symptoms for patient.  Patient was discharged home. Patient advised to return to ER if alteration in mental status, onset of fevers, visual changes, or peripheral weakness/numbness/tingling.  Disposition and Plan     Clinical Impression:  1. Other migraine without status migrainosus, not intractable        Disposition:  Discharge    Follow-up:  Ammon Ortega MD  [address] [place] [id number]  [phone]    Follow up        Medications Prescribed:  Current Discharge Medication List        START taking these medications    Details   Ketorolac Tromethamine 10 MG Oral Tab Take 1 tablet (10 mg total) by mouth every 6 (six) hours as needed for Pain.  Qty: 15 tablet, Refills: 0

## 2024-12-11 ENCOUNTER — HOSPITAL ENCOUNTER (OUTPATIENT)
Age: 39
Discharge: HOME | End: 2024-12-11
Payer: MEDICAID

## 2024-12-11 VITALS
OXYGEN SATURATION: 99 % | TEMPERATURE: 98 F | DIASTOLIC BLOOD PRESSURE: 77 MMHG | SYSTOLIC BLOOD PRESSURE: 139 MMHG | RESPIRATION RATE: 14 BRPM | HEART RATE: 94 BPM

## 2024-12-11 DIAGNOSIS — H66.92 LEFT ACUTE OTITIS MEDIA: Primary | ICD-10-CM

## 2024-12-11 PROCEDURE — 99213 OFFICE O/P EST LOW 20 MIN: CPT

## 2024-12-11 RX ORDER — CEFDINIR 300 MG/1
300 CAPSULE ORAL 2 TIMES DAILY
Qty: 14 CAPSULE | Refills: 0 | Status: SHIPPED | OUTPATIENT
Start: 2024-12-11 | End: 2024-12-18

## 2024-12-11 RX ORDER — FLUCONAZOLE 150 MG/1
150 TABLET ORAL ONCE
Qty: 2 TABLET | Refills: 0 | Status: SHIPPED | OUTPATIENT
Start: 2024-12-11 | End: 2024-12-11

## 2024-12-11 NOTE — ED PROVIDER NOTES
Patient Seen in: [facility]    History     Chief Complaint   Patient presents with    Ear Problem Pain     Stated Complaint: Allergies - Constant ringing in ears may be from ears or neck?    HPI    38-year-old female presents with chief complaint of bilateral earache.  Onset 2024.  Patient reports associated bilateral ear ringing.  Patient denies fever, chills, otorrhea, nasal drainage, sore throat, abdominal pain, nausea, vomiting, diarrhea, constipation, rash, neck pain, neck swelling, restricted neck movement, cough, dyspnea.    Past Medical History:    Allergic rhinitis    Ragweed grass hay dust    Anxiety    COPD (chronic obstructive pulmonary disease) ([facility])    It was an incedental finding i was never told about on a ct    Essential hypertension    During panic attacks    Fibromyalgia    Hidradenitis suppurativa    Hyperlipidemia    Always beverley high on test    Migraines       Past Surgical History:   Procedure Laterality Date          2 children 04 09 and 10 14    Other surgical history      Ankle            Family History   Problem Relation Age of Onset    Anemia Maternal Grandmother     Pulmonary Disease Maternal Grandmother     Stroke Maternal Grandmother     Obesity Father     Obesity Brother        Social History     Socioeconomic History    Marital status: Single   Tobacco Use    Smoking status: Every Day     Current packs/day: 1.00     Average packs/day: 1 pack/day for 22.0 years (22.0 ttl pk-yrs)     Types: Cigarettes    Smokeless tobacco: Never    Tobacco comments:     I dont like it but cant stop   Vaping Use    Vaping status: Never Used   Substance and Sexual Activity    Alcohol use: Never    Drug use: Never   Other Topics Concern    Caffeine Concern No    Exercise Yes    Seat Belt Yes    Special Diet No    Stress Concern Yes    Weight Concern Yes    Grew up on a farm No    History of tanning Yes    Outdoor occupation No    Breast feeding No    Reaction to local anesthetic  No    Pt has a pacemaker No    Pt has a defibrillator No     Social Drivers of Health      Received from [employer]    [facility]       Review of Systems    Positive for stated complaint: Allergies - Constant ringing in ears may be from ears or neck?  Other systems are as noted in HPI.  Constitutional and vital signs reviewed.      All other systems reviewed and negative except as noted above.    PSFH elements reviewed from today and agreed except as otherwise stated in HPI.    Physical Exam     ED Triage Vitals   BP 12/11/24 1220 139/77   Pulse 12/11/24 1200 94   Resp 12/11/24 1200 14   Temp 12/11/24 1200 98.1 °F (36.7 °C)   Temp src 12/11/24 1200 Oral   SpO2 12/11/24 1200 99 %   O2 Device 12/11/24 1200 None (Room air)       Current:/77   Pulse 94   Temp 98.1 °F (36.7 °C) (Oral)   Resp 14   LMP 12/01/2024 (Exact Date)   SpO2 99%      PULSE OX within normal limits on room air as interpreted by this provider.    Constitutional: The patient is cooperative. Appears well-developed and well-nourished.  Mild discomfort.  Psychological: Alert, No abnormalities of mood, affect.  Head: Normocephalic/atraumatic.  Eyes: Pupils are equal round reactive to light.  Conjunctiva are within normal limits.  ENT: Pharynx noninjected.  Tonsils within normal size limits bilaterally.  No tonsillar exudates.  Left TM injected.  Purulent fluid present proximally to intact left TM.  Right TM within normal limits.  Auditory canals within normal limits bilaterally.  No post auricular tenderness, adenopathy or erythema.  No otorrhea.  Mucous membranes moist.  Neck: The neck is supple.  No meningeal signs. Nontender.   Respiratory: Respiratory effort was normal.  There is no stridor.  Air entry is equal.  Cardiovascular: Regular rate and rhythm.  Capillary refill is brisk.    Genitourinary: Not examined.  Lymphatic: No gross lymphadenopathy noted.  Musculoskeletal: Musculoskeletal system is grossly intact.  There is  no obvious deformity.  Neurological: Gross motor movement is intact in all 4 extremities.  Patient exhibits normal speech.  Skin: Skin is normal to inspection.  Warm and dry.  No obvious bruising.  No obvious rash.    ED Course   Labs Reviewed - No data to display    MDM     Differential diagnosis including but not limited to otitis media, otitis externa, cerumen impaction, ear effusion, tinnitus    Physical exam remained stable as previously documented.  Physical exam findings discussed with patient.  Patient states that she has been prescribed Augmentin within the past 30 days.  Prescription for cefdinir provided.    I have given the patient instructions regarding their diagnoses, expectations, follow up, and ER precautions. I explained to the patient that emergent conditions may arise and to go to the ER for new, worsening or any persistent conditions. I've explained the importance of following up with their doctor as instructed. The patient verbalized understanding of the discharge instructions and plan.    Disposition and Plan     Clinical Impression:  1. Left acute otitis media        Disposition:  Discharge    Follow-up:  Ammon Ortega MD  [address] [place] [id number] [phone]    Call in 1 day  For follow-up    Your ear, nose, and throat specialist or provider below    Call in 1 day  For follow-up    Jung Ellis MD  [address] [place] [id number] [phone]    Call in 1 day  For follow-up      Medications Prescribed:  Discharge Medication List as of 12/11/2024 12:21 PM        START taking these medications    Details   cefdinir 300 MG Oral Cap Take 1 capsule (300 mg total) by mouth 2 (two) times daily for 7 days., Normal, Disp-14 capsule, R-0

## 2024-12-11 NOTE — ED INITIAL ASSESSMENT (HPI)
Patient with bilateral ear ringing since 12/5  States she hears it more while lying in bed   Ears have discomfort

## 2025-02-11 ENCOUNTER — HOSPITAL ENCOUNTER (OUTPATIENT)
Age: 40
Discharge: HOME | End: 2025-02-11
Payer: MEDICAID

## 2025-02-11 VITALS
DIASTOLIC BLOOD PRESSURE: 92 MMHG | HEART RATE: 93 BPM | SYSTOLIC BLOOD PRESSURE: 130 MMHG | TEMPERATURE: 99 F | RESPIRATION RATE: 18 BRPM | OXYGEN SATURATION: 96 %

## 2025-02-11 DIAGNOSIS — H65.92 FLUID LEVEL BEHIND TYMPANIC MEMBRANE OF LEFT EAR: ICD-10-CM

## 2025-02-11 DIAGNOSIS — R03.0 ELEVATED BLOOD PRESSURE READING: ICD-10-CM

## 2025-02-11 DIAGNOSIS — H92.02 LEFT EAR PAIN: Primary | ICD-10-CM

## 2025-02-11 PROCEDURE — 99213 OFFICE O/P EST LOW 20 MIN: CPT

## 2025-02-11 PROCEDURE — 99212 OFFICE O/P EST SF 10 MIN: CPT

## 2025-02-11 RX ORDER — FLUTICASONE PROPIONATE 50 MCG
2 SPRAY, SUSPENSION (ML) NASAL DAILY
Qty: 16 G | Refills: 0 | Status: SHIPPED | OUTPATIENT
Start: 2025-02-11

## 2025-02-11 RX ORDER — CETIRIZINE HYDROCHLORIDE 10 MG/1
10 TABLET ORAL DAILY
Qty: 30 TABLET | Refills: 0 | Status: SHIPPED | OUTPATIENT
Start: 2025-02-11

## 2025-02-11 NOTE — ED INITIAL ASSESSMENT (HPI)
Patient arrives ambulatory with c/o left sided ear pain and ear \"falling asleep\"/numb sensation. Reports no relief with tylenol/ibuprofen.

## 2025-02-11 NOTE — DISCHARGE INSTRUCTIONS
Take over-the-counter ibuprofen or Tylenol for pain avoid chewing gum start using the Flonase and Zyrtec to help with the fluid behind the eardrum call to set up an appointment with ears nose and throat specialist your pain may be due to TMJ you are being provided education about this.  If you develop worsening pain any drainage from the ear any outer ear swelling redness or warmth headache fever or neck stiffness go to the nearest emergency department also follow-up with your primary care provider for elevated blood pressure reading.

## 2025-02-11 NOTE — ED PROVIDER NOTES
Patient Seen in: [facility]      History     Chief Complaint   Patient presents with    Fever     My ear is very painful feels like there's fluid - Entered by patient     Stated Complaint: Ear Problem Pain    Subjective:   HPI      This is a 39-year-old female with history of allergic rhinitis, anxiety, COPD, fibromyalgia, hyperlipidemia, migraines presenting with ear pain.  Patient states she is having left-sided ear pain feels full and asleep.  No injury or trauma but does have a history of allergies not able to get into see her ENT so came here to be evaluated.  Denies headache dizziness nausea or vomiting fever or drainage from the ear outer ear swelling redness or warmth.    Objective:     Past Medical History:    Allergic rhinitis    Ragweed grass hay dust    Anxiety    COPD (chronic obstructive pulmonary disease) (HCC)    It was an incedental finding i was never told about on a ct    Essential hypertension    During panic attacks    Fibromyalgia    Hidradenitis suppurativa    Hyperlipidemia    Always beverley high on test    Migraines              Past Surgical History:   Procedure Laterality Date          2 children 04 09 and 10 14    Other surgical history      Ankle                Social History     Socioeconomic History    Marital status: Single   Tobacco Use    Smoking status: Every Day     Current packs/day: 1.00     Average packs/day: 1 pack/day for 22.0 years (22.0 ttl pk-yrs)     Types: Cigarettes    Smokeless tobacco: Never    Tobacco comments:     I dont like it but cant stop   Vaping Use    Vaping status: Never Used   Substance and Sexual Activity    Alcohol use: Never    Drug use: Never   Other Topics Concern    Caffeine Concern No    Exercise Yes    Seat Belt Yes    Special Diet No    Stress Concern Yes    Weight Concern Yes    Grew up on a farm No    History of tanning Yes    Outdoor occupation No    Breast feeding No    Reaction to local anesthetic No    Pt has a pacemaker  No    Pt has a defibrillator No     Social Drivers of Health      Received from [employer] [employer]    [facility]              Review of Systems    Positive for stated complaint: Ear Problem Pain  Other systems are as noted in HPI.  Constitutional and vital signs reviewed.      All other systems reviewed and negative except as noted above.    Physical Exam     ED Triage Vitals [02/11/25 1307]   BP (!) 130/92   Pulse 93   Resp 18   Temp 98.5 °F (36.9 °C)   Temp src Oral   SpO2 96 %   O2 Device None (Room air)       Current Vitals:   Vital Signs  BP: (!) 130/92  Pulse: 93  Resp: 18  Temp: 98.5 °F (36.9 °C)  Temp src: Oral    Oxygen Therapy  SpO2: 96 %  O2 Device: None (Room air)        Physical Exam  Vitals and nursing note reviewed.   Constitutional:       Appearance: Normal appearance.   HENT:      Head: Atraumatic.      Jaw: No trismus, tenderness, swelling or malocclusion.        Right Ear: Tympanic membrane normal.      Ears:      Comments: Left TM is not erythematous nonbulging canal is clear + fluid behind the TM no mastoid TTP no pain with pulling of the tragus     Nose: Nose normal. No congestion or rhinorrhea.      Mouth/Throat:      Mouth: Mucous membranes are moist.      Pharynx: Oropharynx is clear. No posterior oropharyngeal erythema.   Eyes:      Conjunctiva/sclera: Conjunctivae normal.   Cardiovascular:      Rate and Rhythm: Normal rate.   Pulmonary:      Effort: Pulmonary effort is normal.   Musculoskeletal:         General: Normal range of motion.      Cervical back: Normal range of motion. No rigidity or tenderness.   Lymphadenopathy:      Cervical: No cervical adenopathy.   Skin:     General: Skin is warm.      Capillary Refill: Capillary refill takes less than 2 seconds.   Neurological:      General: No focal deficit present.      Mental Status: She is alert and oriented to person, place, and time.             ED Course   Labs Reviewed - No data to display         MDM           Medical  Decision Making  39-year-old female nontoxic-appearing presenting with ear pain.  DDx TMJ versus otalgia versus eustachian tube dysfunction versus otitis media versus otitis externa versus mastoiditis physical exam not consistent with mastoiditis no clinical indication for labs or imaging discussed possible diagnosis with the patient discussed Flonase and Zyrtec to help with the fluid behind the eardrum.  Discussed over-the-counter Tylenol and ibuprofen and following up with ENT.  All education instructions outpatient follow-up with PCP for elevated blood pressure reading ENT and ER precautions placed in discharge paperwork.  Patient acknowledges understanding discharge instructions.    Problems Addressed:  Elevated blood pressure reading: acute illness or injury  Fluid level behind tympanic membrane of left ear: acute illness or injury  Left ear pain: acute illness or injury    Risk  OTC drugs.  Prescription drug management.        Disposition and Plan     Clinical Impression:  1. Left ear pain    2. Fluid level behind tympanic membrane of left ear    3. Elevated blood pressure reading         Disposition:  Discharge  2/11/2025  1:10 pm    Follow-up:  Ammon Ortega MD  [address] [place] [address]  [facility] [address] [phone]    Schedule an appointment as soon as possible for a visit in 3 days  For elevated blood pressure reading    Radha Escobedo MD  [address] [facility] [address]  [facility] [address] [phone]    Call   Ears nose and throat specialist to follow-up with          Medications Prescribed:  Current Discharge Medication List        START taking these medications    Details   fluticasone propionate 50 MCG/ACT Nasal Suspension 2 sprays by Nasal route daily.  Qty: 16 g, Refills: 0      cetirizine 10 MG Oral Tab Take 1 tablet (10 mg total) by mouth daily.  Qty: 30 tablet, Refills: 0                 Supplementary Documentation:

## (undated) NOTE — Clinical Note
Hello, I saw your patient in clinic today for weight loss/management. I have recommended intensive lifestyle/behavioral modifications for weight loss. She will follow up routinely for ongoing support. Please let me know if you have any questions or concerns. Thank you very much for referring your patient to our clinic.    Take good care, ADELAIDE Hong

## (undated) NOTE — ED AVS SNAPSHOT
Marco Posada   MRN: [id number]    Department:  [facility] Emergency Department   Date of Visit:  2/5/2020           Disclosure     Insurance plans vary and the physician(s) referred by the ER may not be covered by your plan.  Please contact your CARE PHYSICIAN AT ONCE OR RETURN IMMEDIATELY TO THE EMERGENCY DEPARTMENT. If you have been prescribed any medication(s), please fill your prescription right away and begin taking the medication(s) as directed.   If you believe that any of the medications